# Patient Record
Sex: FEMALE | Race: WHITE | NOT HISPANIC OR LATINO | Employment: FULL TIME | URBAN - METROPOLITAN AREA
[De-identification: names, ages, dates, MRNs, and addresses within clinical notes are randomized per-mention and may not be internally consistent; named-entity substitution may affect disease eponyms.]

---

## 2024-09-04 ENCOUNTER — OFFICE VISIT (OUTPATIENT)
Dept: URGENT CARE | Facility: CLINIC | Age: 34
End: 2024-09-04
Payer: COMMERCIAL

## 2024-09-04 VITALS
BODY MASS INDEX: 40.22 KG/M2 | DIASTOLIC BLOOD PRESSURE: 69 MMHG | TEMPERATURE: 99 F | RESPIRATION RATE: 20 BRPM | HEIGHT: 63 IN | SYSTOLIC BLOOD PRESSURE: 118 MMHG | WEIGHT: 227 LBS | OXYGEN SATURATION: 100 % | HEART RATE: 105 BPM

## 2024-09-04 DIAGNOSIS — R09.89 SYMPTOMS OF UPPER RESPIRATORY INFECTION (URI): ICD-10-CM

## 2024-09-04 DIAGNOSIS — U07.1 COVID-19: Primary | ICD-10-CM

## 2024-09-04 LAB
CTP QC/QA: YES
SARS-COV-2 AG RESP QL IA.RAPID: POSITIVE

## 2024-09-04 PROCEDURE — 99214 OFFICE O/P EST MOD 30 MIN: CPT | Mod: S$PBB,,,

## 2024-09-04 PROCEDURE — 99214 OFFICE O/P EST MOD 30 MIN: CPT | Mod: PBBFAC

## 2024-09-04 PROCEDURE — 87811 SARS-COV-2 COVID19 W/OPTIC: CPT | Mod: PBBFAC

## 2024-09-04 RX ORDER — BENZONATATE 200 MG/1
200 CAPSULE ORAL 3 TIMES DAILY PRN
Qty: 30 CAPSULE | Refills: 0 | Status: SHIPPED | OUTPATIENT
Start: 2024-09-04 | End: 2024-09-14

## 2024-09-04 NOTE — PATIENT INSTRUCTIONS
CDC RECOMMENDATIONS     If You Test Positive for COVID-19 (Isolate)  Everyone, regardless of vaccination status.    Stay home for 5 days.  If you have no symptoms or your symptoms are resolving after 5 days, you can leave your house.  Continue to wear a mask around others for 5 additional days.  If you have a fever, continue to stay home until your fever resolves.

## 2024-09-04 NOTE — LETTER
September 4, 2024      [facility]  [address] [address]  Phone: [phone]       Patient: Fanny Murguia   YOB: 1990  Date of Visit: 09/04/2024    To Whom It May Concern:    Jessica Murguia  was at [facility] on 09/04/2024. The patient may return to work/school on 09/09/2024 with no restrictions. If you have any questions or concerns, or if I can be of further assistance, please do not hesitate to contact me.    Sincerely,    DEEPTI Velasquez

## 2024-09-04 NOTE — PROGRESS NOTES
"Subjective:       Patient ID: Fanny Murguia is a 34 y.o. female.    Vitals:  height is 5' 3" (1.6 m) and weight is 103 kg (227 lb). Her oral temperature is 98.8 °F (37.1 °C). Her blood pressure is 118/69 and her pulse is 105. Her respiration is 20 and oxygen saturation is 100%.     Chief Complaint: Sore Throat (X yesterday. Chills, hoarseness, nausea.)    Agree with CC, 33 y/o white female, has tried Claritin w/Corcidin. Patient's last menstrual period was 02/01/2024 (approximate).      Sore Throat   Associated symptoms include congestion. Pertinent negatives include no coughing, diarrhea, headaches, shortness of breath, trouble swallowing or vomiting.       Constitution: Positive for chills and sweating.   HENT:  Positive for congestion, sore throat and voice change. Negative for trouble swallowing.    Respiratory:  Negative for cough, sputum production, shortness of breath, wheezing and asthma.    Gastrointestinal:  Positive for nausea. Negative for vomiting and diarrhea.   Allergic/Immunologic: Negative for asthma.   Neurological:  Negative for dizziness and headaches.       Objective:      Physical Exam   Constitutional: She is oriented to person, place, and time. She is cooperative. She is easily aroused.  Non-toxic appearance. She does not appear ill. No distress. overweightawake  HENT:   Head: Normocephalic and atraumatic.   Ears:   Right Ear: Tympanic membrane and ear canal normal.   Left Ear: Tympanic membrane and ear canal normal.   Nose: Nose normal.   Mouth/Throat: Uvula is midline, oropharynx is clear and moist and mucous membranes are normal.   Hoarse phonation       Comments: Hoarse phonation   Eyes: Conjunctivae and lids are normal.   Neck: Neck supple.   Cardiovascular: S1 normal, S2 normal and normal heart sounds. Tachycardia present.   Pulmonary/Chest: Effort normal and breath sounds normal.   Musculoskeletal:      Right lower leg: No edema.      Left lower leg: No edema.   Lymphadenopathy: "     She has cervical adenopathy.        Left cervical: Superficial cervical adenopathy present.   Neurological: She is alert, oriented to person, place, and time and easily aroused. GCS eye subscore is 4. GCS verbal subscore is 5. GCS motor subscore is 6.   Skin: Skin is warm, dry, intact and not diaphoretic. Capillary refill takes less than 2 seconds.   Psychiatric: Her speech is normal and behavior is normal.   Nursing note and vitals reviewed.        Assessment:       1. COVID-19    2. Symptoms of upper respiratory infection (URI)          Plan:     COVID test is positive today in clinic, [facility] updated quarantine guidelines discussed, encourage patient to remain hydrated, Tylenol as needed, when to seek ER treatment. She appears stable for discharge.     COVID-19  -     benzonatate (TESSALON) 200 MG capsule; Take 1 capsule (200 mg total) by mouth 3 (three) times daily as needed for Cough.  Dispense: 30 capsule; Refill: 0    Symptoms of upper respiratory infection (URI)  -     SARS Coronavirus 2 Antigen, POCT Manual Read  -     benzonatate (TESSALON) 200 MG capsule; Take 1 capsule (200 mg total) by mouth 3 (three) times daily as needed for Cough.  Dispense: 30 capsule; Refill: 0           Results for orders placed or performed in visit on 09/04/24   SARS Coronavirus 2 Antigen, POCT Manual Read   Result Value Ref Range    SARS Coronavirus 2 Antigen Positive (A) Negative     Acceptable Yes

## 2024-09-06 ENCOUNTER — OFFICE VISIT (OUTPATIENT)
Dept: URGENT CARE | Facility: CLINIC | Age: 34
End: 2024-09-06
Payer: COMMERCIAL

## 2024-09-06 VITALS
HEIGHT: 63 IN | RESPIRATION RATE: 20 BRPM | SYSTOLIC BLOOD PRESSURE: 138 MMHG | TEMPERATURE: 98 F | BODY MASS INDEX: 40.61 KG/M2 | WEIGHT: 229.19 LBS | HEART RATE: 75 BPM | OXYGEN SATURATION: 96 % | DIASTOLIC BLOOD PRESSURE: 83 MMHG

## 2024-09-06 DIAGNOSIS — R09.81 NASAL CONGESTION: ICD-10-CM

## 2024-09-06 DIAGNOSIS — R05.1 ACUTE COUGH: ICD-10-CM

## 2024-09-06 DIAGNOSIS — J02.9 SORE THROAT: Primary | ICD-10-CM

## 2024-09-06 DIAGNOSIS — U07.1 COVID-19: ICD-10-CM

## 2024-09-06 LAB
CTP QC/QA: YES
MOLECULAR STREP A: NEGATIVE

## 2024-09-06 PROCEDURE — 99213 OFFICE O/P EST LOW 20 MIN: CPT | Mod: PBBFAC

## 2024-09-06 RX ORDER — FLUTICASONE PROPIONATE 50 MCG
2 SPRAY, SUSPENSION (ML) NASAL DAILY
Qty: 16 G | Refills: 0 | Status: SHIPPED | OUTPATIENT
Start: 2024-09-06

## 2024-09-06 RX ORDER — PROMETHAZINE HYDROCHLORIDE AND DEXTROMETHORPHAN HYDROBROMIDE 6.25; 15 MG/5ML; MG/5ML
5 SYRUP ORAL EVERY 8 HOURS PRN
Qty: 120 ML | Refills: 0 | Status: SHIPPED | OUTPATIENT
Start: 2024-09-06

## 2024-09-06 RX ORDER — IBUPROFEN 600 MG/1
600 TABLET ORAL EVERY 6 HOURS PRN
Qty: 15 TABLET | Refills: 0 | Status: SHIPPED | OUTPATIENT
Start: 2024-09-06

## 2024-09-06 RX ORDER — LORATADINE 10 MG/1
10 TABLET ORAL DAILY PRN
Qty: 30 TABLET | Refills: 0 | Status: SHIPPED | OUTPATIENT
Start: 2024-09-06 | End: 2024-10-06

## 2024-09-06 NOTE — PROGRESS NOTES
"Subjective:      Patient ID: Fanny Murguia is a 34 y.o. female.    Vitals:  height is 5' 3" (1.6 m) and weight is 104 kg (229 lb 3.2 oz). Her oral temperature is 98 °F (36.7 °C). Her blood pressure is 138/83 and her pulse is 75. Her respiration is 20 and oxygen saturation is 96%.     Chief Complaint: Sore Throat (Return visit already tested + for covid. Cough, sore throat, dizziness.Requesting strep testing. )    CC as above.  She is taking coricidin and doing saline nasal irrigation, no relief.     Sore Throat   This is a new problem. The current episode started in the past 7 days. The problem has been unchanged. There has been no fever. The pain is moderate. Associated symptoms include congestion, coughing, headaches and a plugged ear sensation.       HENT:  Positive for congestion and sore throat.    Respiratory:  Positive for cough.    Neurological:  Positive for headaches.      Objective:     Physical Exam   Constitutional: She is oriented to person, place, and time. She appears well-developed. She is cooperative.  Non-toxic appearance. No distress. obesity  HENT:   Head: Normocephalic and atraumatic.   Ears:   Right Ear: Hearing and external ear normal. A middle ear effusion is present.   Left Ear: Hearing and external ear normal. A middle ear effusion is present.   Nose: Mucosal edema and congestion present. No rhinorrhea or nasal deformity. No epistaxis. Right sinus exhibits no maxillary sinus tenderness and no frontal sinus tenderness. Left sinus exhibits no maxillary sinus tenderness and no frontal sinus tenderness.   Mouth/Throat: Uvula is midline and mucous membranes are normal. Mucous membranes are moist. No trismus in the jaw. Normal dentition. No uvula swelling. Posterior oropharyngeal erythema present. No oropharyngeal exudate or posterior oropharyngeal edema. Oropharynx is clear.   Moderate nasal turbinate, postnasal drip      Comments: Moderate nasal turbinate, postnasal drip  Eyes: " Conjunctivae and lids are normal. No scleral icterus. vision grossly intact periorbital hyperpigmentation   Neck: Trachea normal and phonation normal. Neck supple. No edema present. No erythema present. No neck rigidity present.   Cardiovascular: Normal rate, regular rhythm, normal heart sounds and normal pulses.   Pulmonary/Chest: Effort normal and breath sounds normal. No respiratory distress. She has no decreased breath sounds. She has no wheezes. She has no rhonchi.   Abdominal: Normal appearance.   Musculoskeletal: Normal range of motion.         General: No deformity. Normal range of motion.   Lymphadenopathy:     She has no cervical adenopathy.   Neurological: no focal deficit. She is alert and oriented to person, place, and time. She exhibits normal muscle tone. Coordination normal.   Skin: Skin is warm, dry, intact, not diaphoretic and not pale.   Psychiatric: Her speech is normal and behavior is normal. Mood normal.   Nursing note and vitals reviewed.      Assessment:     1. Sore throat    2. Nasal congestion    3. Acute cough    4. COVID-19      Results for orders placed or performed in visit on 09/06/24   POCT Strep A, Molecular   Result Value Ref Range    Molecular Strep A, POC Negative Negative     Acceptable Yes         Plan:       Sore throat  -     POCT Strep A, Molecular  -     loratadine (CLARITIN) 10 mg tablet; Take 1 tablet (10 mg total) by mouth daily as needed for Allergies.  Dispense: 30 tablet; Refill: 0  -     ibuprofen (ADVIL,MOTRIN) 600 MG tablet; Take 1 tablet (600 mg total) by mouth every 6 (six) hours as needed for Pain. Take with food  Dispense: 15 tablet; Refill: 0    Nasal congestion  -     fluticasone propionate (FLONASE) 50 mcg/actuation nasal spray; 2 sprays (100 mcg total) by Each Nostril route once daily.  Dispense: 16 g; Refill: 0    Acute cough  -     loratadine (CLARITIN) 10 mg tablet; Take 1 tablet (10 mg total) by mouth daily as needed for Allergies.   Dispense: 30 tablet; Refill: 0  -     promethazine-dextromethorphan (PROMETHAZINE-DM) 6.25-15 mg/5 mL Syrp; Take 5 mLs by mouth every 8 (eight) hours as needed (cough).  Dispense: 120 mL; Refill: 0    COVID-19    Rest. Drink plenty of fluids. Tylenol or ibuprofen for any fever or aches.  Flonase and loratadine daily for nasal congestion and sinus symptoms as needed.  Cough medication may cause drowsiness, no driving when taking. Return for any new or worsening symptoms.

## 2024-09-06 NOTE — LETTER
September 6, 2024      [facility]  [facility] [facility] [address]  Phone: [phone]       Patient: Fanny Murguia   YOB: 1990  Date of Visit: 09/06/2024    To Whom It May Concern:    Jessica Murguia  was at [facility] on 09/06/2024. The patient may return to work/school on 9/8/24 with no restrictions. If you have any questions or concerns, or if I can be of further assistance, please do not hesitate to contact me.    Sincerely,    TETE Young NP

## 2024-09-06 NOTE — PATIENT INSTRUCTIONS
Rest. Drink plenty of fluids. Tylenol or ibuprofen for any fever or aches.  Flonase and loratadine daily for nasal congestion and sinus symptoms as needed.  Cough medication may cause drowsiness, no driving when taking. Return for any new or worsening symptoms. Go to the nearest ER for worsening symptoms, such as severe pain, chest pain, shortness of breath, palpitations, etc.

## 2024-10-14 DIAGNOSIS — R33.9 RETENTION OF URINE, UNSPECIFIED: Primary | ICD-10-CM

## 2024-10-23 ENCOUNTER — HOSPITAL ENCOUNTER (OUTPATIENT)
Dept: RADIOLOGY | Facility: HOSPITAL | Age: 34
Discharge: HOME | End: 2024-10-23
Attending: UROLOGY
Payer: COMMERCIAL

## 2024-10-23 DIAGNOSIS — R33.9 RETENTION OF URINE, UNSPECIFIED: ICD-10-CM

## 2024-10-23 PROCEDURE — 74178 CT ABD&PLV WO CNTR FLWD CNTR: CPT | Mod: TC

## 2024-10-23 PROCEDURE — 25500020 PHARM REV CODE 255

## 2024-10-23 RX ADMIN — IOHEXOL 100 ML: 350 INJECTION, SOLUTION INTRAVENOUS at 03:10

## 2024-11-11 ENCOUNTER — OFFICE VISIT (OUTPATIENT)
Dept: GASTROENTEROLOGY | Facility: CLINIC | Age: 34
End: 2024-11-11
Payer: COMMERCIAL

## 2024-11-11 VITALS
OXYGEN SATURATION: 97 % | HEART RATE: 83 BPM | WEIGHT: 227.63 LBS | DIASTOLIC BLOOD PRESSURE: 86 MMHG | TEMPERATURE: 98 F | RESPIRATION RATE: 16 BRPM | HEIGHT: 63 IN | SYSTOLIC BLOOD PRESSURE: 129 MMHG | BODY MASS INDEX: 40.33 KG/M2

## 2024-11-11 DIAGNOSIS — Z87.738 HISTORY OF HIRSCHSPRUNG'S DISEASE: ICD-10-CM

## 2024-11-11 DIAGNOSIS — K59.04 CHRONIC IDIOPATHIC CONSTIPATION: Primary | ICD-10-CM

## 2024-11-11 PROBLEM — K92.1 HEMATOCHEZIA: Status: RESOLVED | Noted: 2023-03-30 | Resolved: 2024-11-11

## 2024-11-11 PROCEDURE — 3008F BODY MASS INDEX DOCD: CPT | Mod: CPTII,,, | Performed by: NURSE PRACTITIONER

## 2024-11-11 PROCEDURE — 1159F MED LIST DOCD IN RCRD: CPT | Mod: CPTII,,, | Performed by: NURSE PRACTITIONER

## 2024-11-11 PROCEDURE — 3074F SYST BP LT 130 MM HG: CPT | Mod: CPTII,,, | Performed by: NURSE PRACTITIONER

## 2024-11-11 PROCEDURE — 99214 OFFICE O/P EST MOD 30 MIN: CPT | Mod: S$PBB,,, | Performed by: NURSE PRACTITIONER

## 2024-11-11 PROCEDURE — 99213 OFFICE O/P EST LOW 20 MIN: CPT | Mod: PBBFAC | Performed by: NURSE PRACTITIONER

## 2024-11-11 PROCEDURE — 1160F RVW MEDS BY RX/DR IN RCRD: CPT | Mod: CPTII,,, | Performed by: NURSE PRACTITIONER

## 2024-11-11 PROCEDURE — 3079F DIAST BP 80-89 MM HG: CPT | Mod: CPTII,,, | Performed by: NURSE PRACTITIONER

## 2024-11-11 RX ORDER — LUBIPROSTONE 8 UG/1
8 CAPSULE ORAL 2 TIMES DAILY
Qty: 60 CAPSULE | Refills: 5 | Status: SHIPPED | OUTPATIENT
Start: 2024-11-11

## 2024-11-11 RX ORDER — OXYBUTYNIN CHLORIDE 10 MG/1
10 TABLET, EXTENDED RELEASE ORAL
COMMUNITY
Start: 2024-10-31

## 2024-11-11 NOTE — ASSESSMENT & PLAN NOTE
Recommend soluble fiber supplementation  Avoid straining or sitting on the toilet for long periods of time  Pelvic ultrasound August 9, 2023 revealed increased ovarian volumes and otherwise unremarkable exam.    CT scan abdomen and pelvis without contrast August 26, 2023 revealed enlarged appearance of the left ovary, potentially by underlying large follicle, located high in the left hemipelvis and posterior to the descending colon.  In the setting of left lower quadrant pain, further ovary evaluation with ultrasound suggested.  Given the position of the ovary, transabdominal technique suggested.  Mild hepatic steatosis.  Small nonobstructive right renal stones.    Pelvic ultrasound August 26, 2023 revealed left ovarian cyst.  She underwent colonoscopy December 7, 2023 which was normal and no specimens were collected with a recommended recall of 10 years.  Continue Amitiza 8 mcg twice daily as directed (prescription sent to [facility] and requested that she compare prices between there and [facility])  Call with updates  Follow-up clinic visit with NP in 6 months

## 2024-11-11 NOTE — LETTER
November 12, 2024      [facility]  [address] [address]  Phone: [phone]       Patient: Fanny Murguia   YOB: 1990  Date of Visit: 11/12/2024    To Whom It May Concern:    Jessica Murguia  was at [facility] on 11/12/2024. The patient may return to work/school on 11/12/2024 with no restrictions. If you have any questions or concerns, or if I can be of further assistance, please do not hesitate to contact me.    Sincerely,    Miladis Holloway NP

## 2024-11-11 NOTE — PROGRESS NOTES
Subjective:       Patient ID: Fanny Murguia is a 34 y.o. female.    Chief Complaint: Chronic constipation (Pt states that she feels that she isnt using the bathroom enough. 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  She was referred for constipation and seen March 30, 2023.  She reported a longstanding history of chronic constipation and going up to 5-7 days without a bowel movement.  She had tried several OTC medications for treatment of constipation with minimal relief noted including stool softeners and magnesium citrate.  She had associated bloating and gas.  She had occasional straining and did not always feel completely evacuated when she did have a bowel movement.  She had occasional red blood with bowel movements noted on the tissue after wiping that was very infrequent.  She expressed concerns about hemorrhoids.  She had occasional nausea without vomiting.  She had tried increasing dietary fiber and was seeing a dietitian for nutritional counseling.  Her appetite was good and her weight was stable.  She denied fever, chills, hematemesis, odynophagia, dysphagia, acid reflux, pyrosis, or early satiety.  She denied melena, fecal urgency, fecal incontinence, or pain with defecation.     Pelvic ultrasound August 9, 2023 revealed increased ovarian volumes and otherwise unremarkable exam.  CT scan abdomen and pelvis without contrast August 26, 2023 revealed enlarged appearance of the left ovary, potentially by underlying large follicle, located high in the left hemipelvis and posterior to the descending colon.  In the setting of left lower quadrant pain, further ovary evaluation with ultrasound suggested.  Given the position of the ovary, transabdominal technique suggested.  Mild hepatic steatosis.  Small nonobstructive right  renal stones.  Pelvic ultrasound August 26, 2023 revealed left ovarian cyst.     She was seen for a follow-up visit November 9, 2023.  She reported taking Linzess 145 mcg as needed and then 72 mcg as needed but having to stop the medication and dosages secondary to feeling drained with taking the medication.  She reported minimal change in symptoms from previous office visit in regards to constipation, bloating, gas, and abdominal discomfort.  Her appetite was good and her weight was stable.  She denied fever, chills, hematemesis, odynophagia, dysphagia, acid reflux, pyrosis, or early satiety.  She denied melena, hematochezia, fecal urgency, fecal incontinence, or pain with defecation.  She was prescribed Amitiza 8 mcg twice daily.     She underwent colonoscopy December 7, 2023 which was normal and no specimens were collected with a recommended recall of 10 years.     She was seen for a follow-up visit May 9, 2024.  She reported taking Amitiza 8 mcg twice daily as needed, as well as increased water intake and MiraLax as needed.  She had made dietary modifications.  This had alleviated symptoms of constipation, bloating, gas, and abdominal discomfort.  She reported feeling well at that time without any problems noted.    CT scan abdomen and pelvis with and without contrast October 23, 2024 revealed couple of punctate nonobstructing medullary calculi in the right kidney, otherwise unremarkable.     Today, she presents for a follow-up visit.  She reports that she has been out of Amitiza mcg for the past several months as she was unsure of the medication was sent to [facility] pharmacy.  I did inform the patient at time of last office visit May 9, 2024 that the medication was being sent to the pharmacy and this was verified.  She reports less frequent bowel movements since being out of Amitiza and is requesting a refill of the medication.  She typically has 1 formed stool every 1-3 days but does not always feel completely  evacuated.  She denies melena, hematochezia, fecal urgency, fecal incontinence, or pain with defecation.  She denies abdominal pain.  Her appetite is good and her weight is stable.  She denies fever, chills, nausea, vomiting, hematemesis, odynophagia, dysphagia, acid reflux, pyrosis, early satiety, or abdominal pain.     She denies ever having an EGD done. She denies regular NSAID use or use of blood thinners. She denies tobacco or alcohol use. She denies a family history of IBD, colon polyps, or colon cancer.      Review of patient's allergies indicates:   Allergen Reactions    Codeine      Pt reports reaction is convulsions    Hydrocodone     Morphine     Pcn [penicillins]      Pt reports reaction is convulsions    Sulfa (sulfonamide antibiotics) Rash    Tramadol Other (See Comments)    Ketorolac Other (See Comments)    Levofloxacin Hives     Other reaction(s): Generalized body aches    Metformin Diarrhea    Metronidazole     Norethindrone ac-eth estradiol Other (See Comments)     Other reaction(s): Mood swings  Other reaction(s): Mood swings    Doxycycline Nausea And Vomiting    Nitrofurantoin monohyd/m-cryst Nausea And Vomiting     Past Medical History:   Diagnosis Date    ADHD (attention deficit hyperactivity disorder)     Anxiety     Hirschsprung's disease     Kidney stone     OCD (obsessive compulsive disorder)     PCOS (polycystic ovarian syndrome)      Past Surgical History:   Procedure Laterality Date    COLON SURGERY  1990    When born    COLONOSCOPY N/A 12/07/2023    Procedure: COLONOSCOPY;  Surgeon: Miguelito Bird MD;  Location: [facility] ENDOSCOPY;  Service: Endoscopy;  Laterality: N/A;    COLOSTOMY  1990    2 months old    COLOSTOMY CLOSURE  1991    10 months old    LAPAROSCOPIC ENDO-RECTAL PULL THROUGH FOR HIRSCHSPRUNG'S DISEASE      SMALL INTESTINE SURGERY  1990    Imtestinal surgery when I was born.     Family History:   family history includes Breast cancer (age of onset: 30) in her maternal aunt;  Cancer in her father; Endometriosis in her sister; Heart disease in her maternal aunt, maternal grandmother, paternal grandfather, and paternal grandmother; Hirschsprung's disease in her father; Hypertension in her mother; No Known Problems in her brother, brother, brother, and sister; Ovarian cancer in her maternal aunt.    Social History:    reports that she quit smoking about 5 years ago. Her smoking use included cigarettes and vaping with nicotine. She has quit using smokeless tobacco. She reports current alcohol use. She reports that she does not use drugs.    Review of Systems  Negative except as noted in the HPI.      Objective:      Physical Exam  Constitutional:       Appearance: Normal appearance.   HENT:      Head: Normocephalic.      Mouth/Throat:      Mouth: Mucous membranes are moist.   Eyes:      Extraocular Movements: Extraocular movements intact.      Conjunctiva/sclera: Conjunctivae normal.      Pupils: Pupils are equal, round, and reactive to light.   Cardiovascular:      Rate and Rhythm: Normal rate and regular rhythm.      Pulses: Normal pulses.      Heart sounds: Normal heart sounds.   Pulmonary:      Effort: Pulmonary effort is normal.      Breath sounds: Normal breath sounds.   Abdominal:      General: Bowel sounds are normal.      Palpations: Abdomen is soft.   Musculoskeletal:         General: Normal range of motion.      Cervical back: Normal range of motion and neck supple.   Skin:     General: Skin is warm and dry.   Neurological:      General: No focal deficit present.      Mental Status: She is alert and oriented to person, place, and time.   Psychiatric:         Mood and Affect: Mood normal.         Behavior: Behavior normal.         Thought Content: Thought content normal.         Judgment: Judgment normal.         Home Medications:     Current Outpatient Medications   Medication Sig    diclofenac sodium (VOLTAREN ARTHRITIS PAIN) 1 % Gel Apply 2 g topically 4 (four) times daily. Do  not exceed 32 grams/day: do not to exceed 8 grams/day/single joint of upper extremities; do not to exceed 16 grams/day/single joint of lower extremities.  Please request refill of this medication from your PCP.    oxybutynin (DITROPAN-XL) 10 MG 24 hr tablet Take 10 mg by mouth.    lubiprostone (AMITIZA) 8 MCG Cap Take 1 capsule (8 mcg total) by mouth 2 (two) times daily.     No current facility-administered medications for this visit.     Laboratory Results:     Recent Results (from the past 24 weeks)   POCT urine pregnancy    Collection Time: 07/08/24  3:13 PM   Result Value Ref Range    POC Preg Test, Ur Negative Negative     Acceptable Yes    POCT Urinalysis, Dipstick, Automated, W/O Scope    Collection Time: 07/08/24  3:13 PM   Result Value Ref Range    POC Blood, Urine Negative Negative    POC Bilirubin, Urine Negative Negative    POC Urobilinogen, Urine 1.0 0.1 - 1.1    POC Ketones, Urine Negative Negative    POC Protein, Urine Negative Negative    POC Nitrates, Urine Negative Negative    POC Glucose, Urine Negative Negative    pH, UA 7.0     POC Specific Gravity, Urine 1.025 1.003 - 1.029    POC Leukocytes, Urine Negative Negative   Urinalysis, Reflex to Urine Culture    Collection Time: 07/08/24  3:30 PM    Specimen: Urine   Result Value Ref Range    Color, UA Light-Yellow Yellow, Light-Yellow, Dark Yellow, Tara, Straw    Appearance, UA Clear Clear    Specific Gravity, UA 1.035 (H) 1.005 - 1.030    pH, UA 6.5 5.0 - 8.5    Protein, UA Trace (A) Negative    Glucose, UA Normal Negative, Normal    Ketones, UA Negative Negative    Blood, UA Negative Negative    Bilirubin, UA Negative Negative    Urobilinogen, UA Normal 0.2, 1.0, Normal    Nitrites, UA Negative Negative    Leukocyte Esterase, UA Negative Negative    RBC, UA 0-5 None Seen, 0-2, 3-5, 0-5 /HPF    WBC, UA 0-5 None Seen, 0-2, 3-5, 0-5 /HPF    Bacteria, UA Trace (A) None Seen /HPF    Squamous Epithelial Cells, UA Few (A) None Seen /HPF     Mucous, UA Few (A) None Seen /LPF    Hyaline Casts, UA None Seen None Seen /lpf   Comprehensive Metabolic Panel    Collection Time: 07/17/24  9:57 AM   Result Value Ref Range    Sodium 141 136 - 145 mmol/L    Potassium 4.6 3.5 - 5.1 mmol/L    Chloride 102 98 - 107 mmol/L    CO2 26 21 - 32 mmol/L    Glucose 100 74 - 106 mg/dL    Blood Urea Nitrogen 12 7.0 - 18.0 mg/dL    Creatinine 0.68 0.60 - 1.30 mg/dL    Calcium 10.0 8.5 - 10.1 mg/dL    Protein Total 7.9 6.4 - 8.2 gm/dL    Albumin 5.1 (H) 3.4 - 5.0 g/dL    Globulin 2.8 1.2 - 3.0 gm/dL    Albumin/Globulin Ratio 1.8 1.5 - 2.0 ratio    Bilirubin Total 0.5 0.2 - 1.0 mg/dL    ALP 83 38 - 126 unit/L    ALT 28 7 - 52 unit/L    AST 22 15 - 37 unit/L    eGFR >60 mL/min/1.73/m2    BUN/Creatinine Ratio 18    Bilirubin, Direct    Collection Time: 07/17/24  9:57 AM   Result Value Ref Range    Bilirubin Direct 0.1 0.0 - 0.5 mg/dL   TSH    Collection Time: 07/17/24  9:57 AM   Result Value Ref Range    TSH 1.174 0.350 - 4.940 uIU/mL   Urinalysis    Collection Time: 07/17/24  9:57 AM   Result Value Ref Range    Color, UA Yellow Yellow, Light-Yellow, Dark Yellow, Tara, Straw    Appearance, UA Clear Clear    Specific Gravity, UA >=1.030 1.005 - 1.030    pH, UA 6.0 5.0 - 8.5    Protein, UA Negative Negative    Glucose, UA Negative Negative, Normal    Ketones, UA Negative Negative    Blood, UA Negative Negative    Bilirubin, UA Negative Negative    Urobilinogen, UA 0.2 0.2, 1.0, Normal    Nitrites, UA Negative Negative    Leukocyte Esterase, UA Negative Negative   Lipid Panel    Collection Time: 07/17/24  9:57 AM   Result Value Ref Range    Cholesterol Total 188 0 - 200 mg/dL    HDL Cholesterol 51 35 - 60 mg/dL    Triglyceride 73 30 - 150 mg/dL    Cholesterol/HDL Ratio 4     Very Low Density Lipoprotein 15     LDL Cholesterol 122.00 0.00 - 129.00 mg/dL   CBC with Differential    Collection Time: 07/17/24  9:57 AM   Result Value Ref Range    WBC 7.00 4.50 - 11.50 x10(3)/mcL    RBC  5.24 4.20 - 5.40 x10(6)/mcL    Hgb 15.2 12.0 - 16.0 g/dL    Hct 44.8 37.0 - 47.0 %    MCV 85.5 80.0 - 94.0 fL    MCH 29.0 27.0 - 31.0 pg    MCHC 33.9 33.0 - 36.0 g/dL    RDW 12.5 11.5 - 17.0 %    Platelet 314 130 - 400 x10(3)/mcL    MPV 9.7 7.4 - 10.4 fL    Neut % 64.0 %    Lymph % 26.5 %    Mono % 9.5 %    Lymph # 1.9 0.6 - 4.6 x10(3)/mcL    Neut # 4.4 2.1 - 9.2 x10(3)/mcL    Mono # 0.7 0.1 - 1.3 x10(3)/mcL   Urinalysis, Microscopic    Collection Time: 07/17/24  9:57 AM   Result Value Ref Range    Bacteria, UA None Seen None Seen, Rare, Occasional /HPF    RBC, UA None Seen None Seen, 0-2, 3-5, 0-5 /HPF    WBC, UA 0-2 None Seen, 0-2, 3-5, 0-5 /HPF    Squamous Epithelial Cells, UA Rare None Seen, Rare, Occasional, Occ /HPF   POCT Urinalysis, Dipstick, Automated, W/O Scope    Collection Time: 07/30/24  6:12 PM   Result Value Ref Range    POC Blood, Urine Negative Negative    POC Bilirubin, Urine Negative Negative    POC Urobilinogen, Urine 0.2e.u./dl 0.1 - 1.1    POC Ketones, Urine Negative Negative    POC Protein, Urine Negative Negative    POC Nitrates, Urine Negative Negative    POC Glucose, Urine Negative Negative    pH, UA 7.0     POC Specific Gravity, Urine 1.025 1.003 - 1.029    POC Leukocytes, Urine Negative Negative   POCT urine pregnancy    Collection Time: 07/30/24  6:13 PM   Result Value Ref Range    POC Preg Test, Ur Negative Negative     Acceptable Yes    Trichomonas vaginalis Amplified RNA    Collection Time: 07/30/24  6:19 PM    Specimen: Urine   Result Value Ref Range    SOURCE: URINE     Trichomonas vaginalis amplified RNA Negative Negative   Chlamydia/GC, PCR    Collection Time: 07/30/24  6:19 PM    Specimen: Urine   Result Value Ref Range    Chlamydia trachomatis PCR Not Detected Not Detected    N. gonorrhea PCR Not Detected Not Detected    Source Urine    Wet Prep, Genital    Collection Time: 07/30/24  6:44 PM    Specimen: Cervicovaginal; Genital   Result Value Ref Range    WBC, Wet  Prep Few (A) None Seen    Clue Cells, Wet Prep None Seen None Seen    Trichomonas, Wet Prep None Seen None Seen    Yeast, Wet Prep None Seen None Seen   Urinalysis, Reflex to Urine Culture    Collection Time: 08/03/24 12:05 AM    Specimen: Urine   Result Value Ref Range    Color, UA Light-Yellow Yellow, Light-Yellow, Dark Yellow, Tara, Straw    Appearance, UA Clear Clear    Specific Gravity, UA >=1.030 1.005 - 1.030    pH, UA 6.0 5.0 - 8.5    Protein, UA Negative Negative    Glucose, UA Negative Negative, Normal    Ketones, UA Negative Negative    Blood, UA Negative Negative    Bilirubin, UA Negative Negative    Urobilinogen, UA 0.2 0.2, 1.0, Normal    Nitrites, UA Negative Negative    Leukocyte Esterase, UA Negative Negative   Pregnancy, urine rapid    Collection Time: 08/03/24 12:05 AM   Result Value Ref Range    hCG Qualitative, Urine Negative Negative   Comp. Metabolic Panel    Collection Time: 08/03/24  2:00 AM   Result Value Ref Range    Sodium 141 136 - 145 mmol/L    Potassium 3.9 3.5 - 5.1 mmol/L    Chloride 105 98 - 107 mmol/L    CO2 26 22 - 29 mmol/L    Glucose 102 (H) 74 - 100 mg/dL    Blood Urea Nitrogen 12.7 7.0 - 18.7 mg/dL    Creatinine 0.81 0.55 - 1.02 mg/dL    Calcium 10.1 8.4 - 10.2 mg/dL    Protein Total 8.0 6.4 - 8.3 gm/dL    Albumin 4.4 3.5 - 5.0 g/dL    Globulin 3.6 (H) 2.4 - 3.5 gm/dL    Albumin/Globulin Ratio 1.2 1.1 - 2.0 ratio    Bilirubin Total 0.3 <=1.5 mg/dL    ALP 95 40 - 150 unit/L    ALT 30 0 - 55 unit/L    AST 17 5 - 34 unit/L    eGFR >60 mL/min/1.73/m2    Anion Gap 10.0 mEq/L    BUN/Creatinine Ratio 16    Lipase    Collection Time: 08/03/24  2:00 AM   Result Value Ref Range    Lipase Level 21 <=60 U/L   CBC with Differential    Collection Time: 08/03/24  2:00 AM   Result Value Ref Range    WBC 9.27 4.50 - 11.50 x10(3)/mcL    RBC 5.06 4.20 - 5.40 x10(6)/mcL    Hgb 14.8 12.0 - 16.0 g/dL    Hct 43.3 37.0 - 47.0 %    MCV 85.6 80.0 - 94.0 fL    MCH 29.2 27.0 - 31.0 pg    MCHC 34.2 33.0  - 36.0 g/dL    RDW 12.6 11.5 - 17.0 %    Platelet 353 130 - 400 x10(3)/mcL    MPV 10.7 (H) 7.4 - 10.4 fL    Neut % 57.9 %    Lymph % 29.2 %    Mono % 8.7 %    Eos % 3.0 %    Basophil % 1.0 %    Lymph # 2.71 0.6 - 4.6 x10(3)/mcL    Neut # 5.36 2.1 - 9.2 x10(3)/mcL    Mono # 0.81 0.1 - 1.3 x10(3)/mcL    Eos # 0.28 0 - 0.9 x10(3)/mcL    Baso # 0.09 <=0.2 x10(3)/mcL    IG# 0.02 0 - 0.04 x10(3)/mcL    IG% 0.2 %    NRBC% 0.0 %   PREGNANCY TEST, URINE RAPID    Collection Time: 08/15/24  2:07 PM   Result Value Ref Range    Preg Test, Ur Negative Negative   SARS Coronavirus 2 Antigen, POCT Manual Read    Collection Time: 09/04/24 10:03 AM   Result Value Ref Range    SARS Coronavirus 2 Antigen Positive (A) Negative     Acceptable Yes    POCT Strep A, Molecular    Collection Time: 09/06/24 10:27 AM   Result Value Ref Range    Molecular Strep A, POC Negative Negative     Acceptable Yes      Imaging Results:     Narrative & Impression  EXAMINATION:  CT ABDOMEN PELVIS W WO CONTRAST     CLINICAL HISTORY:  r33.9; Retention of urine, unspecified     TECHNIQUE:  Low dose axial images, sagittal and coronal reformations were obtained from the lung bases to the pubic symphysis following the IV administration of  contrast.  Pre contrasted imaging was performed as well. Automatic exposure control is utilized to reduce patient radiation exposure.     COMPARISON:  12/09/2022     FINDINGS:  The lung bases are clear.     The liver appears normal.  No liver mass or lesion is seen.  Portal and hepatic veins appear normal.     The gallbladder appears grossly unremarkable.     The pancreas appears normal.  No pancreatic mass or lesion is seen.     The spleen appears normal.  No splenic mass or lesion is seen.     The adrenal glands appear normal.  No adrenal nodule is seen.     The kidneys are normal in size.  No hydronephrosis is seen.  No hydroureter is seen.  There are couple of punctate nonobstructing medullary  calculi in the right kidney in the mid to lower pole.  No ureterolithiasis is seen.     Urinary bladder appears normal.  No bladder distension is seen.     No colitis is seen.  No diverticulitis is seen.  No colonic mass or lesion or inflammatory process is seen.  The appendix appears normal.     No free air is seen.  No free fluid is seen.     Uterus and ovaries appear grossly unremarkable.     The bones appear grossly unremarkable.     Impression:     Couple of punctate nonobstructing medullary calculi in the right kidney     Otherwise unremarkable      Electronically signed by:German Yan  Date:                                            10/23/2024  Time:                                           15:50    Assessment/Plan:     Problem List Items Addressed This Visit          GI    Chronic idiopathic constipation - Primary     Recommend soluble fiber supplementation  Avoid straining or sitting on the toilet for long periods of time  Pelvic ultrasound August 9, 2023 revealed increased ovarian volumes and otherwise unremarkable exam.    CT scan abdomen and pelvis without contrast August 26, 2023 revealed enlarged appearance of the left ovary, potentially by underlying large follicle, located high in the left hemipelvis and posterior to the descending colon.  In the setting of left lower quadrant pain, further ovary evaluation with ultrasound suggested.  Given the position of the ovary, transabdominal technique suggested.  Mild hepatic steatosis.  Small nonobstructive right renal stones.    Pelvic ultrasound August 26, 2023 revealed left ovarian cyst.  She underwent colonoscopy December 7, 2023 which was normal and no specimens were collected with a recommended recall of 10 years.  Continue Amitiza 8 mcg twice daily as directed (prescription sent to [facility] and requested that she compare prices between there and [facility])  Call with updates  Follow-up clinic visit with NP in 6 months         Relevant Medications     lubiprostone (AMITIZA) 8 MCG Cap    History of Hirschsprung's disease     See above

## 2024-11-15 ENCOUNTER — TELEPHONE (OUTPATIENT)
Dept: GYNECOLOGY | Facility: CLINIC | Age: 34
End: 2024-11-15
Payer: COMMERCIAL

## 2024-11-15 NOTE — TELEPHONE ENCOUNTER
----- Message from BRUNILDA Velázquez sent at 11/15/2024  9:11 AM CST -----  Tell her they will decide that (the providers)  when she comes in for her nov visit.  ----- Message -----  From: Dejon Peace  Sent: 11/15/2024   9:10 AM CST  To: [facility] Clinical Support Staff    Pt has an appt with you on 11/22/24 for follow up to ED visit.  She also has an appt on 12/5/24 with Aida Rome for an annual exam.  Pt is requesting to merge these 2 appts.  Please advise whether or not to approve her request.

## 2024-11-20 ENCOUNTER — OFFICE VISIT (OUTPATIENT)
Dept: URGENT CARE | Facility: CLINIC | Age: 34
End: 2024-11-20
Payer: COMMERCIAL

## 2024-11-20 VITALS
BODY MASS INDEX: 40.1 KG/M2 | HEIGHT: 63 IN | TEMPERATURE: 98 F | RESPIRATION RATE: 16 BRPM | SYSTOLIC BLOOD PRESSURE: 121 MMHG | WEIGHT: 226.31 LBS | HEART RATE: 69 BPM | OXYGEN SATURATION: 98 % | DIASTOLIC BLOOD PRESSURE: 80 MMHG

## 2024-11-20 DIAGNOSIS — N89.8 VAGINAL DISCHARGE: Primary | ICD-10-CM

## 2024-11-20 DIAGNOSIS — Z32.02 NEGATIVE PREGNANCY TEST: ICD-10-CM

## 2024-11-20 DIAGNOSIS — J01.90 ACUTE SINUSITIS, RECURRENCE NOT SPECIFIED, UNSPECIFIED LOCATION: ICD-10-CM

## 2024-11-20 LAB
B-HCG UR QL: NEGATIVE
BILIRUB UR QL STRIP: NEGATIVE
CLUE CELLS VAG QL WET PREP: ABNORMAL
CTP QC/QA: YES
GLUCOSE UR QL STRIP: NEGATIVE
KETONES UR QL STRIP: NEGATIVE
LEUKOCYTE ESTERASE UR QL STRIP: NEGATIVE
PH, POC UA: 7
POC BLOOD, URINE: NEGATIVE
POC NITRATES, URINE: NEGATIVE
PROT UR QL STRIP: NEGATIVE
SP GR UR STRIP: 1.02 (ref 1–1.03)
T VAGINALIS VAG QL WET PREP: ABNORMAL
UROBILINOGEN UR STRIP-ACNC: NORMAL (ref 0.1–1.1)
WBC #/AREA VAG WET PREP: ABNORMAL
YEAST SPEC QL WET PREP: ABNORMAL

## 2024-11-20 PROCEDURE — 87210 SMEAR WET MOUNT SALINE/INK: CPT

## 2024-11-20 PROCEDURE — 99214 OFFICE O/P EST MOD 30 MIN: CPT | Mod: S$PBB,,,

## 2024-11-20 PROCEDURE — 99215 OFFICE O/P EST HI 40 MIN: CPT | Mod: PBBFAC

## 2024-11-20 PROCEDURE — 81003 URINALYSIS AUTO W/O SCOPE: CPT | Mod: PBBFAC

## 2024-11-20 PROCEDURE — 81025 URINE PREGNANCY TEST: CPT | Mod: PBBFAC

## 2024-11-20 RX ORDER — LORATADINE 10 MG/1
10 TABLET ORAL DAILY PRN
Qty: 30 TABLET | Refills: 0 | Status: SHIPPED | OUTPATIENT
Start: 2024-11-20 | End: 2024-12-20

## 2024-11-20 RX ORDER — PHENAZOPYRIDINE HYDROCHLORIDE 200 MG/1
200 TABLET, FILM COATED ORAL 3 TIMES DAILY
COMMUNITY
Start: 2024-11-14

## 2024-11-20 RX ORDER — FLUTICASONE PROPIONATE 50 MCG
2 SPRAY, SUSPENSION (ML) NASAL DAILY
Qty: 16 G | Refills: 0 | Status: SHIPPED | OUTPATIENT
Start: 2024-11-20

## 2024-11-20 NOTE — PROGRESS NOTES
"Subjective:      Patient ID: Fanny Murguia is a 34 y.o. female.    Vitals:  height is 5' 3" (1.6 m) and weight is 102.6 kg (226 lb 4.8 oz). Her temperature is 98.1 °F (36.7 °C). Her blood pressure is 121/80 and her pulse is 69. Her respiration is 16 and oxygen saturation is 98%.     Chief Complaint: Otalgia (Otalgia, Lt ear since 0200.) and Vaginal Itching (Vaginal itching/burning since Monday. States feels like there is urine retention. Had scope done Thursday. GYN appt 11/22)    Cc as above. State she has a white discharge with vaginal irritation.     Otalgia   There is pain in the left ear. This is a new problem. The current episode started today. The problem occurs every few hours. The problem has been unchanged. There has been no fever. The pain is mild. Pertinent negatives include no coughing or ear discharge. Associated symptoms comments: sneezing. She has tried nothing for the symptoms.   Vaginal Itching  The patient's primary symptoms include vaginal discharge.       Constitution: Negative.   HENT:  Positive for ear pain. Negative for ear discharge.    Cardiovascular: Negative.    Respiratory: Negative.  Negative for cough.    Genitourinary:  Positive for vaginal discharge.   Skin: Negative.    Allergic/Immunologic: Positive for sneezing.   Neurological: Negative.       Objective:     Physical Exam   Constitutional: She is oriented to person, place, and time. She appears well-developed. She is cooperative.  Non-toxic appearance. No distress. obesityawake  HENT:   Head: Normocephalic and atraumatic.   Ears:   Right Ear: Hearing, external ear and ear canal normal. A middle ear effusion is present.   Left Ear: Hearing, external ear and ear canal normal. A middle ear effusion is present.   Nose: Mucosal edema and congestion present. No rhinorrhea or nasal deformity. No epistaxis. Right sinus exhibits no maxillary sinus tenderness and no frontal sinus tenderness. Left sinus exhibits no maxillary sinus " tenderness and no frontal sinus tenderness.   Mouth/Throat: Uvula is midline, oropharynx is clear and moist and mucous membranes are normal. Mucous membranes are moist. No trismus in the jaw. Normal dentition. No uvula swelling. No oropharyngeal exudate, posterior oropharyngeal edema or posterior oropharyngeal erythema. Oropharynx is clear.   Eyes: Conjunctivae and lids are normal. No scleral icterus. vision grossly intact periorbital hyperpigmentation   Neck: Trachea normal and phonation normal. Neck supple. No edema present. No erythema present. No neck rigidity present.   Cardiovascular: Normal rate, regular rhythm, normal heart sounds and normal pulses.   Pulmonary/Chest: Effort normal and breath sounds normal. No respiratory distress. She has no decreased breath sounds. She has no rhonchi.   Abdominal: Normal appearance.   Genitourinary:    Vaginal discharge present.           Comments: Reports white discharge (exam deferred)     Musculoskeletal: Normal range of motion.         General: No deformity. Normal range of motion.   Neurological: no focal deficit. She is alert and oriented to person, place, and time. She exhibits normal muscle tone. Coordination normal.   Skin: Skin is warm, dry, intact, not diaphoretic and not pale.   Psychiatric: Her speech is normal and behavior is normal. Mood normal.   Nursing note and vitals reviewed.      Assessment:     1. Vaginal discharge    2. Acute sinusitis, recurrence not specified, unspecified location    3. Negative pregnancy test      Results for orders placed or performed in visit on 11/20/24   POCT Urinalysis, Dipstick, Automated, W/O Scope    Collection Time: 11/20/24 12:22 PM   Result Value Ref Range    POC Blood, Urine Negative Negative    POC Bilirubin, Urine Negative Negative    POC Urobilinogen, Urine 0.2e.u./dl 0.1 - 1.1    POC Ketones, Urine Negative Negative    POC Protein, Urine Negative Negative    POC Nitrates, Urine Negative Negative    POC Glucose,  Urine Negative Negative    pH, UA 7.0     POC Specific Gravity, Urine 1.020 1.003 - 1.029    POC Leukocytes, Urine Negative Negative   POCT urine pregnancy    Collection Time: 11/20/24 12:23 PM   Result Value Ref Range    POC Preg Test, Ur Negative Negative     Acceptable Yes          Plan:       Vaginal discharge  -     POCT urine pregnancy  -     POCT Urinalysis, Dipstick, Automated, W/O Scope  -     Wet Prep, Genital    Acute sinusitis, recurrence not specified, unspecified location  -     fluticasone propionate (FLONASE) 50 mcg/actuation nasal spray; 2 sprays (100 mcg total) by Each Nostril route once daily.  Dispense: 16 g; Refill: 0  -     loratadine (CLARITIN) 10 mg tablet; Take 1 tablet (10 mg total) by mouth daily as needed for Allergies.  Dispense: 30 tablet; Refill: 0    Negative pregnancy test    Flonase and loratadine for sinus symptoms. Wet prep test pending. The clinic will call with positive results. Keep schedule appointment with GYN on Friday. Er precautions provided.

## 2024-11-20 NOTE — LETTER
November 20, 2024      [facility]  [facility] [facility] [address]  Phone: [phone]       Patient: Fanny Murguia   YOB: 1990  Date of Visit: 11/20/2024    To Whom It May Concern:    Jessica Murguia  was at [facility] on 11/20/2024. The patient may return to work/school on 11/22/24 with no restrictions. If you have any questions or concerns, or if I can be of further assistance, please do not hesitate to contact me.    Sincerely,  TETE Young NP

## 2024-11-20 NOTE — PATIENT INSTRUCTIONS
Flonase and loratadine for sinus symptoms. Wet prep test pending. The clinic will call with positive results. Keep schedule appointment with GYN on Friday. Go to the nearest ER for worsening symptoms, such as severe abdominal pain, chest pain, shortness of breath, palpitations, etc.

## 2024-11-22 ENCOUNTER — OFFICE VISIT (OUTPATIENT)
Dept: GYNECOLOGY | Facility: CLINIC | Age: 34
End: 2024-11-22
Payer: COMMERCIAL

## 2024-11-22 VITALS
RESPIRATION RATE: 20 BRPM | WEIGHT: 223 LBS | HEART RATE: 68 BPM | BODY MASS INDEX: 38.07 KG/M2 | TEMPERATURE: 98 F | DIASTOLIC BLOOD PRESSURE: 61 MMHG | HEIGHT: 64 IN | SYSTOLIC BLOOD PRESSURE: 103 MMHG | OXYGEN SATURATION: 100 %

## 2024-11-22 DIAGNOSIS — R10.2 PELVIC PAIN: ICD-10-CM

## 2024-11-22 DIAGNOSIS — N83.9 DISORDER OF OVULATION: ICD-10-CM

## 2024-11-22 DIAGNOSIS — N97.9 FEMALE INFERTILITY, PRIMARY: ICD-10-CM

## 2024-11-22 DIAGNOSIS — E28.2 PCOS (POLYCYSTIC OVARIAN SYNDROME): ICD-10-CM

## 2024-11-22 DIAGNOSIS — M79.18 MYALGIA OF PELVIC FLOOR: ICD-10-CM

## 2024-11-22 DIAGNOSIS — E66.812 CLASS 2 OBESITY: Primary | ICD-10-CM

## 2024-11-22 PROCEDURE — 99214 OFFICE O/P EST MOD 30 MIN: CPT | Mod: PBBFAC

## 2024-11-22 RX ORDER — MEDROXYPROGESTERONE ACETATE 10 MG/1
TABLET ORAL
Qty: 90 TABLET | Refills: 3 | Status: SHIPPED | OUTPATIENT
Start: 2024-11-22

## 2024-11-22 NOTE — PROGRESS NOTES
"[facility] NOTE  [facility]  Women[facility] Health Clinic  [address]  [place] [address]  Phone: [phone]  Fax: [phone]    Subjective:     HPI:  Fanny Murguia is a 34 y.o. G0 who presents complaining for AUB/infertility in setting of PCOS.    Today she reports:  - No menses since 2024.  - Decreased premenstrual sx over the past year.  - Wants pregnancy ASAP, prior to this past year without menses, had gone 1 yr with positive OPKs with timed intercourse with no pregnancy,  subsequently diagnosed with low testosterone.  - Has seen dietitian at [facility] , doing diet and no weight loss yet.  - Intermittent LLQ pain few times per week, sometimes on right side too.    Past Medical History:  Past Medical History:   Diagnosis Date    ADHD (attention deficit hyperactivity disorder)     Anxiety     Hirschsprung's disease     Kidney stone     OCD (obsessive compulsive disorder)     PCOS (polycystic ovarian syndrome)      Past Surgical History:  Past Surgical History:   Procedure Laterality Date    COLON SURGERY      When born    COLONOSCOPY N/A 2023    Procedure: COLONOSCOPY;  Surgeon: Miguelito Bird MD;  Location: [facility] ENDOSCOPY;  Service: Endoscopy;  Laterality: N/A;    COLOSTOMY      2 months old    COLOSTOMY CLOSURE      10 months old    LAPAROSCOPIC ENDO-RECTAL PULL THROUGH FOR HIRSCHSPRUNG'S DISEASE      SMALL INTESTINE SURGERY      Imtestinal surgery when I was born.     Gynecologic History:  LMP: 2024  Hx of STDs: Hx of chlamydia remote.  Hx of abnormal pap: Denies.  Sexually active: Yes  Contraception: None.    Obstetric History:  OB History    Para Term  AB Living   0 0 0 0 0 0   SAB IAB Ectopic Multiple Live Births   0 0 0 0 0       Family History:   Family History   Problem Relation Name Age of Onset    Hypertension Mother      Cancer Father          "nose cancer"    Hirschsprung's disease Father      " Endometriosis Sister          1/2 sister    No Known Problems Sister          1/2 sister    No Known Problems Brother          1/2 brother    No Known Problems Brother          1/2 brother    No Known Problems Brother          1/2 brother    Heart disease Maternal Aunt      Ovarian cancer Maternal Aunt      Breast cancer Maternal Aunt  30    Heart disease Maternal Grandmother      Heart disease Paternal Grandmother      Heart disease Paternal Grandfather         Social History:  Denies tobacco, alcohol, and illicit drug use.  Social History     Socioeconomic History    Marital status:     Number of children: 0   Occupational History    Occupation: sales   Tobacco Use    Smoking status: Former     Current packs/day: 0.00     Types: Cigarettes, Vaping with nicotine     Quit date:      Years since quittin.8    Smokeless tobacco: Former    Tobacco comments:     Quit smoking 2009     Only vaped for a few months    Substance and Sexual Activity    Alcohol use: Yes     Comment: once a year    Drug use: Never    Sexual activity: Yes     Partners: Male     Birth control/protection: None   Social History Narrative    ** Merged History Encounter **          Social Drivers of Health     Financial Resource Strain: Patient Declined (2024)    Overall Financial Resource Strain (CARDIA)     Difficulty of Paying Living Expenses: Patient declined   Food Insecurity: Patient Declined (2024)    Hunger Vital Sign     Worried About Running Out of Food in the Last Year: Patient declined     Ran Out of Food in the Last Year: Patient declined   Transportation Needs: No Transportation Needs (2022)    PRAPARE - Transportation     Lack of Transportation (Medical): No     Lack of Transportation (Non-Medical): No   Physical Activity: Sufficiently Active (2024)    Exercise Vital Sign     Days of Exercise per Week: 5 days     Minutes of Exercise per Session: 150+ min   Stress: Stress Concern Present (2024)     [facility] [employer] - Occupational Stress Questionnaire     Feeling of Stress : To some extent   Housing Stability: Unknown (7/14/2024)    Housing Stability Vital Sign     Unable to Pay for Housing in the Last Year: Patient declined       Allergies:  Review of patient's allergies indicates:   Allergen Reactions    Codeine      Pt reports reaction is convulsions    Hydrocodone     Morphine     Pcn [penicillins]      Pt reports reaction is convulsions    Sulfa (sulfonamide antibiotics) Rash    Tramadol Other (See Comments)    Ketorolac Other (See Comments)    Levofloxacin Hives     Other reaction(s): Generalized body aches    Metformin Diarrhea    Metronidazole     Norethindrone ac-eth estradiol Other (See Comments)     Other reaction(s): Mood swings  Other reaction(s): Mood swings    Doxycycline Nausea And Vomiting    Nitrofurantoin monohyd/m-cryst Nausea And Vomiting       Medications:    Current Outpatient Medications:     diclofenac sodium (VOLTAREN ARTHRITIS PAIN) 1 % Gel, Apply 2 g topically 4 (four) times daily. Do not exceed 32 grams/day: do not to exceed 8 grams/day/single joint of upper extremities; do not to exceed 16 grams/day/single joint of lower extremities.  Please request refill of this medication from your PCP., Disp: 100 g, Rfl: 3    fluticasone propionate (FLONASE) 50 mcg/actuation nasal spray, 2 sprays (100 mcg total) by Each Nostril route once daily., Disp: 16 g, Rfl: 0    loratadine (CLARITIN) 10 mg tablet, Take 1 tablet (10 mg total) by mouth daily as needed for Allergies., Disp: 30 tablet, Rfl: 0    lubiprostone (AMITIZA) 8 MCG Cap, Take 1 capsule (8 mcg total) by mouth 2 (two) times daily., Disp: 60 capsule, Rfl: 5    medroxyPROGESTERone (PROVERA) 10 MG tablet, Take 1 tablet daily for 10 days every month to induce withdrawal bleeding., Disp: 90 tablet, Rfl: 3    oxybutynin (DITROPAN-XL) 10 MG 24 hr tablet, Take 10 mg by mouth., Disp: , Rfl:     phenazopyridine (PYRIDIUM)  "200 MG tablet, Take 200 mg by mouth 3 (three) times daily., Disp: , Rfl:     Review of Systems:  Negative unless noted in HPI.    Objective:     Vitals:    11/22/24 1145   BP: 103/61   BP Location: Right arm   Patient Position: Sitting   Pulse: 68   Resp: 20   Temp: 98.2 °F (36.8 °C)   SpO2: 100%   Weight: 101.2 kg (223 lb)   Height: 5' 4" (1.626 m)     Body mass index is 38.28 kg/m².    Physical Exam:  General: Alert and oriented, in no acute distress.  Lungs: No conversational dyspnea, no respiratory distress, no tachypnea.  Heart: Regular rate.  Abdomen: Soft, non-distended, non tender to palpation, no involuntary guarding, no rebound tenderness.  Extremities: No cords or calf tenderness.  External genitalia: Normal female genitalia without lesion, discharge or tenderness. Normal appearing urethral meatus. Normal appearing external anus.  Bimanual Exam: No inguinal lymphadenopathy noted bilaterally. Vagina with adequate capacity. Uterus normal size, adnexa difficult to assess 2/2 body habitus. Cervix significantly deviated anterior toward abdominal wall.  Pelvic Floor: Bilateral piriformis muscles tender to palpation.  Speculum Exam: Vaginal mucosa normal in appearance, no lesions or discharge. Pink with normal rugae. Unable to visualized cervix, significantly deviated to anterior abdominal wall.    Note: Female RN chaperone present for entirety of exam.     Imaging  No images are attached to the encounter.  Assessment/Plan:    Fanny Murguia is a 34 y.o. G0 with AUB-O and primary infertility in setting of PCOS.    AUB-O, PCOS:  Provera 10mg daily x 10 days each month, pt counseled on need for withdrawal bleed to prevent hyperplasia/malignancy.  Recommended weight loss and referral placed to weight loss program. Counseled on diet and exercise with goal of 5-10% weight loss to improve ovulatory fxn.  Primary infertility:  Recommended consultation with [facility] as nearing AMA, failed timed intercourse with OPKs, " and  with low testosterone.  Recommend  obtain semen analysis.  Pelvic pain:  Likely multifactorial in nature given hx of Hirschsprung disease and significant bowel surgery history.  Also with pelvic floor myalgia on exam, reports I was able to reproduce her pain on pelvic floor exam. Referral placed for pelvic floor PT.    RTC as scheduled for WWE with NP, follow up with GYN prn after [facility] consultation.    Future Appointments   Date Time Provider Department Center   11/26/2024  1:10 PM Jami Oneill FNP [facility] GYN [place]    5/19/2025  2:30 PM Miladis Holloway FNP [facility] GASTRO [place]    7/25/2025  8:00 AM Andry Bailey MD [facility]     Patient and plan were discussed with Dr. Zafar.    Norberto Ferrari MD  [facility], PGY-3

## 2024-12-06 ENCOUNTER — OFFICE VISIT (OUTPATIENT)
Dept: GYNECOLOGY | Facility: CLINIC | Age: 34
End: 2024-12-06
Payer: COMMERCIAL

## 2024-12-06 VITALS
TEMPERATURE: 98 F | HEIGHT: 64 IN | RESPIRATION RATE: 20 BRPM | WEIGHT: 229 LBS | HEART RATE: 77 BPM | OXYGEN SATURATION: 100 % | BODY MASS INDEX: 39.09 KG/M2 | DIASTOLIC BLOOD PRESSURE: 83 MMHG | SYSTOLIC BLOOD PRESSURE: 129 MMHG

## 2024-12-06 DIAGNOSIS — N64.4 BREAST PAIN: ICD-10-CM

## 2024-12-06 DIAGNOSIS — Z01.419 ENCOUNTER FOR ANNUAL ROUTINE GYNECOLOGICAL EXAMINATION: Primary | ICD-10-CM

## 2024-12-06 DIAGNOSIS — L30.4 INTERTRIGO: ICD-10-CM

## 2024-12-06 PROCEDURE — 99214 OFFICE O/P EST MOD 30 MIN: CPT | Mod: PBBFAC | Performed by: NURSE PRACTITIONER

## 2024-12-06 RX ORDER — FLUCONAZOLE 150 MG/1
150 TABLET ORAL DAILY
Qty: 2 TABLET | Refills: 0 | Status: SHIPPED | OUTPATIENT
Start: 2024-12-06 | End: 2024-12-08

## 2024-12-06 RX ORDER — NYSTATIN 100000 [USP'U]/G
POWDER TOPICAL 2 TIMES DAILY
Qty: 60 G | Refills: 3 | Status: SHIPPED | OUTPATIENT
Start: 2024-12-06 | End: 2024-12-20

## 2024-12-06 RX ORDER — CLOTRIMAZOLE 1 %
CREAM (GRAM) TOPICAL 2 TIMES DAILY
Qty: 60 G | Refills: 1 | Status: SHIPPED | OUTPATIENT
Start: 2024-12-06 | End: 2025-01-05

## 2024-12-06 NOTE — PROGRESS NOTES
[facility] -  Gynecology / Women's Health Clinic      Subjective:       Patient ID: Fanny Murguia is a 34 y.o. female.    Chief Complaint:  Gynecologic Exam    History of Present Illness  The patient is G0 here for annual exam. Pt has hx of PCOS and pelvic pain followed by GYN, last visit was 11/2024. Pt was placed on cyclical Provera. Pt is on day 5 of Provera. Denies history of abnormal paps. Last pap 2023-NIL and HPV neg.  Denies breast or urinary complaints. Denies pelvic pain, abnormal bleeding or discharge. Pt reports chlamydia and trichomonas in the past and desires testing. Pt is also followed by urology for IC, bladder spasms, last visit in 2023, pt is now followed by outside urology with recent cystoscopy. HPV vaccinated. Currently not on contraception and declines as she desires pregnancy. Pt was recommended to f/u with [facility]. Denies tobacco use. Dep. screening 0. Denies fly hx of uterine or colon cancer. Maternal aunt with breast and ovarian cancer. Cologuard in 12/2023.    GYN & OB History  Patient's last menstrual period was 02/06/2024 (approximate).   Date of Last Pap: 12/5/2023    Review of patient's allergies indicates:   Allergen Reactions    Codeine      Pt reports reaction is convulsions    Hydrocodone     Morphine     Pcn [penicillins]      Pt reports reaction is convulsions    Sulfa (sulfonamide antibiotics) Rash    Tramadol Other (See Comments)    Ketorolac Other (See Comments)    Levofloxacin Hives     Other reaction(s): Generalized body aches    Metformin Diarrhea    Metronidazole     Norethindrone ac-eth estradiol Other (See Comments)     Other reaction(s): Mood swings  Other reaction(s): Mood swings    Doxycycline Nausea And Vomiting    Nitrofurantoin monohyd/m-cryst Nausea And Vomiting     Past Medical History:   Diagnosis Date    ADHD (attention deficit hyperactivity disorder)     Anxiety     Hirschsprung's disease     Kidney stone     OCD (obsessive compulsive  "disorder)     PCOS (polycystic ovarian syndrome)      OB History    Para Term  AB Living   0 0 0 0 0 0   SAB IAB Ectopic Multiple Live Births   0 0 0 0 0        Review of Systems  Review of Systems    Negative except for pertinent findings for positives per HPI     Objective:    Physical Exam    /83 (BP Location: Left arm, Patient Position: Sitting)   Pulse 77   Temp 98.4 °F (36.9 °C) (Oral)   Resp 20   Ht 5' 4" (1.626 m)   Wt 103.9 kg (229 lb)   LMP 2024 (Approximate)   SpO2 100%   BMI 39.31 kg/m²   GENERAL: Well-developed female. No acute distress.    SKIN: Normal to inspection, warm and intact.  BREASTS: No rashes or erythema. No masses, lumps, discharge. LT breast tenderness @ 6 o'clock near breast fold.  VULVA: General appearance normal; external genitalia with no lesions or erythema.  VAGINA: Mucosa/vaginal vault pink, no abnormal discharge or lesions.  CERVIX: Pink, nulliparous appearing os anterior and high in vaginal vault, no erythema or abnormal discharge.  BIMANUAL EXAM: reveals a 10 week-sized uterus. The uterus is non tender. Cali adnexa reveal no tenderness.  PSYCHIATRIC: Patient is oriented to person, place, and time. Mood and affect are normal.    Assessment:         ICD-10-CM ICD-9-CM   1. Encounter for annual routine gynecological examination  Z01.419 V72.31   2. Breast pain  N64.4 611.71   3. Intertrigo  L30.4 695.89     Plan:   Fanny was seen today for gynecologic exam.    Diagnoses and all orders for this visit:    Encounter for annual routine gynecological examination    Breast pain  -     Mammo Digital Diagnostic Bilat with Christopher; Future  -     US Breast Left Limited; Future    Intertrigo  -     fluconazole (DIFLUCAN) 150 MG Tab; Take 1 tablet (150 mg total) by mouth once daily. May repeat in 3 days if still with symptoms. for 2 doses  -     nystatin (MYCOSTATIN) powder; Apply topically 2 (two) times daily. for 14 days  -     clotrimazole (LOTRIMIN) 1 % cream; " Apply topically 2 (two) times daily.    Pelvic today, pap utd per ACOG  MG and Lt breast uls for LT breast pain  Intertrigo  Follow up in about 1 year (around 12/6/2025) for annual exam.

## 2025-02-20 ENCOUNTER — OFFICE VISIT (OUTPATIENT)
Dept: URGENT CARE | Facility: CLINIC | Age: 35
End: 2025-02-20
Payer: COMMERCIAL

## 2025-02-20 VITALS
WEIGHT: 232 LBS | TEMPERATURE: 98 F | OXYGEN SATURATION: 98 % | HEART RATE: 79 BPM | SYSTOLIC BLOOD PRESSURE: 129 MMHG | DIASTOLIC BLOOD PRESSURE: 85 MMHG | RESPIRATION RATE: 18 BRPM | BODY MASS INDEX: 39.61 KG/M2 | HEIGHT: 64 IN

## 2025-02-20 DIAGNOSIS — J02.9 SORE THROAT: ICD-10-CM

## 2025-02-20 DIAGNOSIS — R09.82 PND (POST-NASAL DRIP): Primary | ICD-10-CM

## 2025-02-20 LAB
CTP QC/QA: YES
MOLECULAR STREP A: NEGATIVE

## 2025-02-20 PROCEDURE — 99215 OFFICE O/P EST HI 40 MIN: CPT | Mod: PBBFAC | Performed by: NURSE PRACTITIONER

## 2025-02-20 PROCEDURE — 87651 STREP A DNA AMP PROBE: CPT | Mod: PBBFAC | Performed by: NURSE PRACTITIONER

## 2025-02-20 RX ORDER — LORATADINE 10 MG/1
10 TABLET ORAL DAILY
Qty: 30 TABLET | Refills: 0 | Status: SHIPPED | OUTPATIENT
Start: 2025-02-20 | End: 2025-03-22

## 2025-02-20 RX ORDER — FLUTICASONE PROPIONATE 50 MCG
2 SPRAY, SUSPENSION (ML) NASAL DAILY
Qty: 18.2 ML | Refills: 0 | Status: SHIPPED | OUTPATIENT
Start: 2025-02-20

## 2025-02-20 NOTE — PROGRESS NOTES
"Subjective:      Patient ID: Fanny Murguia is a 34 y.o. female.    Vitals:  height is 5' 4" (1.626 m) and weight is 105.2 kg (232 lb). Her temperature is 98.1 °F (36.7 °C). Her blood pressure is 129/85 and her pulse is 79. Her respiration is 18 and oxygen saturation is 98%.     Chief Complaint: Sore Throat (Pt c/o sore throat , puss pockets , headache, PND , lt sided tooth extraction possible infection , nausea x last Saturday )    Sore Throat      as stated in chief complaint.  Patient reports intermittent nausea since resolved.  Patient suspected that she has some discomfort and mouth soreness and sore throat from on tooth extraction from 1 year ago and patient states that she can still feel where the tooth was extracted however patient states since when she spoke with her dentist he told her that he might need to have a oral surgeon dress her concerns.  Patient reports sore throat and pus being on the back of her tonsils intermittently none senior living today.  Patient denies taking any medication for treatment of symptoms.      HENT:  Positive for sore throat.       Objective:     Physical Exam   Constitutional: She appears well-developed. She is cooperative.  Non-toxic appearance. She does not appear ill. No distress. awake  HENT:   Head: Atraumatic.   Nose: No purulent discharge. Right sinus exhibits no maxillary sinus tenderness and no frontal sinus tenderness. Left sinus exhibits no maxillary sinus tenderness and no frontal sinus tenderness.   Mouth/Throat: Uvula is midline, oropharynx is clear and moist and mucous membranes are normal. Dental caries present. Cobblestoning present. No oropharyngeal exudate or posterior oropharyngeal edema. Tonsils are 0 on the right. Tonsils are 0 on the left. No tonsillar exudate.   Plaque buildup noted but no gingival hyperplasia there is an old extraction to left lower molar minimal to no but has mild tenderness to palpation no pus, redness or swelling noted      " Comments: Plaque buildup noted but no gingival hyperplasia there is an old extraction to left lower molar minimal to no but has mild tenderness to palpation no pus, redness or swelling noted  Eyes: Right eye exhibits no discharge. Left eye exhibits no discharge. Extraocular movement intact   Neck: Neck supple. No neck rigidity present.   Cardiovascular: Regular rhythm.   Pulmonary/Chest: Effort normal and breath sounds normal. No respiratory distress. She has no wheezes. She has no rhonchi. She has no rales.   Lymphadenopathy:     She has no cervical adenopathy.   Neurological: She is alert.   Skin: Skin is warm, dry and not diaphoretic.   Psychiatric: Her behavior is normal.   Nursing note and vitals reviewed.      Assessment:     1. PND (post-nasal drip)    2. Sore throat      Results for orders placed or performed in visit on 02/20/25   POCT Strep A, Molecular    Collection Time: 02/20/25 12:12 PM   Result Value Ref Range    Molecular Strep A, POC Negative Negative     Acceptable Yes          Plan:   ER precautions given and discussed  Upon examination there was no suspected infection from tooth extraction of 1 year ago.  Patient encouraged to seek dental evaluation as priority  - nasal saline lavage  -warm salt water gargles to your throat  - OTC cold/flu products as desired for symptoms  - Plenty of fluids  - Humidified air  - Tylenol or Motrin for pain/fever  PND (post-nasal drip)    Sore throat  -     POCT Strep A, Molecular    Other orders  -     fluticasone propionate (FLONASE) 50 mcg/actuation nasal spray; 2 sprays (100 mcg total) by Each Nostril route once daily.  Dispense: 18.2 mL; Refill: 0  -     loratadine (CLARITIN) 10 mg tablet; Take 1 tablet (10 mg total) by mouth once daily.  Dispense: 30 tablet; Refill: 0

## 2025-02-20 NOTE — PATIENT INSTRUCTIONS
Please follow instructions on patient education material.      Return to urgent care in 2 to 3 days if symptoms are not improving, immediately if you develop any new or worsening symptoms.   - nasal saline lavage  -warm salt water gargles to your throat  - OTC cold/flu products as desired for symptoms  - Plenty of fluids  - Humidified air  - Tylenol or Motrin for pain/fever  -encouraged to seek dental evaluation as priority    Go to the ER if you experience chest pain with shortness of breath, shortness of breath when moving around your house, high fevers 103.0+, excessive vomiting/diarrhea, or general distress.

## 2025-03-18 ENCOUNTER — HOSPITAL ENCOUNTER (OUTPATIENT)
Dept: RADIOLOGY | Facility: HOSPITAL | Age: 35
Discharge: HOME | End: 2025-03-18
Attending: NURSE PRACTITIONER
Payer: COMMERCIAL

## 2025-03-18 DIAGNOSIS — N64.4 BREAST PAIN: ICD-10-CM

## 2025-03-18 PROCEDURE — 76642 ULTRASOUND BREAST LIMITED: CPT | Mod: TC,50

## 2025-03-18 PROCEDURE — 76642 ULTRASOUND BREAST LIMITED: CPT | Mod: 26,50,, | Performed by: STUDENT IN AN ORGANIZED HEALTH CARE EDUCATION/TRAINING PROGRAM

## 2025-03-18 PROCEDURE — 77062 BREAST TOMOSYNTHESIS BI: CPT | Mod: 26,,, | Performed by: STUDENT IN AN ORGANIZED HEALTH CARE EDUCATION/TRAINING PROGRAM

## 2025-03-18 PROCEDURE — 77066 DX MAMMO INCL CAD BI: CPT | Mod: TC

## 2025-03-18 PROCEDURE — 77066 DX MAMMO INCL CAD BI: CPT | Mod: 26,,, | Performed by: STUDENT IN AN ORGANIZED HEALTH CARE EDUCATION/TRAINING PROGRAM

## 2025-05-19 ENCOUNTER — OFFICE VISIT (OUTPATIENT)
Dept: GASTROENTEROLOGY | Facility: CLINIC | Age: 35
End: 2025-05-19
Payer: COMMERCIAL

## 2025-05-19 VITALS
WEIGHT: 225.63 LBS | BODY MASS INDEX: 38.52 KG/M2 | SYSTOLIC BLOOD PRESSURE: 117 MMHG | TEMPERATURE: 98 F | DIASTOLIC BLOOD PRESSURE: 82 MMHG | OXYGEN SATURATION: 98 % | HEART RATE: 80 BPM | HEIGHT: 64 IN | RESPIRATION RATE: 16 BRPM

## 2025-05-19 DIAGNOSIS — Z87.738 HISTORY OF HIRSCHSPRUNG'S DISEASE: ICD-10-CM

## 2025-05-19 DIAGNOSIS — K59.04 CHRONIC IDIOPATHIC CONSTIPATION: Primary | ICD-10-CM

## 2025-05-19 PROCEDURE — 1160F RVW MEDS BY RX/DR IN RCRD: CPT | Mod: CPTII,,, | Performed by: NURSE PRACTITIONER

## 2025-05-19 PROCEDURE — 3074F SYST BP LT 130 MM HG: CPT | Mod: CPTII,,, | Performed by: NURSE PRACTITIONER

## 2025-05-19 PROCEDURE — 99214 OFFICE O/P EST MOD 30 MIN: CPT | Mod: PBBFAC | Performed by: NURSE PRACTITIONER

## 2025-05-19 PROCEDURE — 3008F BODY MASS INDEX DOCD: CPT | Mod: CPTII,,, | Performed by: NURSE PRACTITIONER

## 2025-05-19 PROCEDURE — 3079F DIAST BP 80-89 MM HG: CPT | Mod: CPTII,,, | Performed by: NURSE PRACTITIONER

## 2025-05-19 PROCEDURE — 1159F MED LIST DOCD IN RCRD: CPT | Mod: CPTII,,, | Performed by: NURSE PRACTITIONER

## 2025-05-19 PROCEDURE — 99214 OFFICE O/P EST MOD 30 MIN: CPT | Mod: S$PBB,,, | Performed by: NURSE PRACTITIONER

## 2025-05-19 RX ORDER — LUBIPROSTONE 8 UG/1
8 CAPSULE ORAL 2 TIMES DAILY
Qty: 60 CAPSULE | Refills: 5 | Status: SHIPPED | OUTPATIENT
Start: 2025-05-19

## 2025-05-19 NOTE — PROGRESS NOTES
Subjective:       Patient ID: Fanny Murguia is a 34 y.o. female.    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  She was referred for constipation and seen March 30, 2023.  She reported a longstanding history of chronic constipation and going up to 5-7 days without a bowel movement.  She had tried several OTC medications for treatment of constipation with minimal relief noted including stool softeners and magnesium citrate.  She had associated bloating and gas.  She had occasional straining and did not always feel completely evacuated when she did have a bowel movement.  She had occasional red blood with bowel movements noted on the tissue after wiping that was very infrequent.  She expressed concerns about hemorrhoids.  She had occasional nausea without vomiting.  She had tried increasing dietary fiber and was seeing a dietitian for nutritional counseling.  Her appetite was good and her weight was stable.  She denied fever, chills, hematemesis, odynophagia, dysphagia, acid reflux, pyrosis, or early satiety.  She denied melena, fecal urgency, fecal incontinence, or pain with defecation.     Pelvic ultrasound August 9, 2023 revealed increased ovarian volumes and otherwise unremarkable exam.  CT scan abdomen and pelvis without contrast August 26, 2023 revealed enlarged appearance of the left ovary, potentially by underlying large follicle, located high in the left hemipelvis and posterior to the descending colon.  In the setting of left lower quadrant pain, further ovary evaluation with ultrasound suggested.  Given the position of the ovary, transabdominal technique suggested.  Mild hepatic steatosis.  Small  nonobstructive right renal stones.  Pelvic ultrasound August 26, 2023 revealed left ovarian cyst.     She was seen for a follow-up visit November 9, 2023.  She reported taking Linzess 145 mcg as needed and then 72 mcg as needed but having to stop the medication and dosages secondary to feeling drained with taking the medication.  She reported minimal change in symptoms from previous office visit in regards to constipation, bloating, gas, and abdominal discomfort.  Her appetite was good and her weight was stable.  She denied fever, chills, hematemesis, odynophagia, dysphagia, acid reflux, pyrosis, or early satiety.  She denied melena, hematochezia, fecal urgency, fecal incontinence, or pain with defecation.  She was prescribed Amitiza 8 mcg twice daily.     She underwent colonoscopy December 7, 2023 which was normal and no specimens were collected with a recommended recall of 10 years.     She was seen for a follow-up visit May 9, 2024.  She reported taking Amitiza 8 mcg twice daily as needed, as well as increased water intake and MiraLax as needed.  She had made dietary modifications.  This had alleviated symptoms of constipation, bloating, gas, and abdominal discomfort.  She reported feeling well at that time without any problems noted.     CT scan abdomen and pelvis with and without contrast October 23, 2024 revealed couple of punctate nonobstructing medullary calculi in the right kidney, otherwise unremarkable.     She was seen for a follow-up visit November 11, 2024.  She reported that she had been out of Amitiza 8 mcg for the past several months as she was unsure if the medication was sent to [facility] pharmacy.  I did inform the patient at time of last office visit May 9, 2024 that the medication was being sent to the pharmacy and this was verified.  She reported less frequent bowel movements since being out of Amitiza and requested a refill of the medication.  She typically had 1 formed stool every 1-3 days but  did not always feel completely evacuated.  She denied melena, hematochezia, fecal urgency, fecal incontinence, or pain with defecation.  She denied abdominal pain.  Her appetite was good and her weight was stable.  She denied fever, chills, nausea, vomiting, hematemesis, odynophagia, dysphagia, acid reflux, pyrosis, early satiety, or abdominal pain.    Today, she presents for a follow-up visit.  She reports not taking Amitiza 8 mcg often and taking soluble fiber supplementation.  She typically has 1 formed stool every 1-2 days and feels that bowel habits have improved with dietary modifications and more intake of fiber.  She does take Amitiza when she has not had a bowel movement in approximately 3 days.  This does help her when she takes the medication.  She otherwise feels well and denies any other problems at this time.     She denies ever having an EGD done. She denies regular NSAID use or use of blood thinners. She denies tobacco or alcohol use. She denies a family history of IBD, colon polyps, or colon cancer.      Review of patient's allergies indicates:   Allergen Reactions    Codeine      Pt reports reaction is convulsions    Hydrocodone     Morphine     Pcn [penicillins]      Pt reports reaction is convulsions    Sulfa (sulfonamide antibiotics) Rash    Tramadol Other (See Comments)    Ketorolac Other (See Comments)    Levofloxacin Hives     Other reaction(s): Generalized body aches    Metformin Diarrhea    Metronidazole     Norethindrone ac-eth estradiol Other (See Comments)     Other reaction(s): Mood swings  Other reaction(s): Mood swings    Doxycycline Nausea And Vomiting    Nitrofurantoin monohyd/m-cryst Nausea And Vomiting     Past Medical History:   Diagnosis Date    ADHD (attention deficit hyperactivity disorder)     Anxiety     Hirschsprung's disease     Kidney stone     OCD (obsessive compulsive disorder)     PCOS (polycystic ovarian syndrome)      Past Surgical History:   Procedure Laterality  Date    COLON SURGERY  1990    When born    COLONOSCOPY N/A 12/07/2023    Procedure: COLONOSCOPY;  Surgeon: Person, Miguelito APPLE MD;  Location: [facility] ENDOSCOPY;  Service: Endoscopy;  Laterality: N/A;    COLOSTOMY  1990    2 months old    COLOSTOMY CLOSURE  1991    10 months old    LAPAROSCOPIC ENDO-RECTAL PULL THROUGH FOR HIRSCHSPRUNG'S DISEASE      SMALL INTESTINE SURGERY  1990    Imtestinal surgery when I was born.     Family History:   family history includes Breast cancer (age of onset: 30) in her maternal aunt; Cancer in her father; Endometriosis in her sister; Heart disease in her maternal aunt, maternal grandmother, paternal grandfather, and paternal grandmother; Hirschsprung's disease in her father; Hypertension in her mother; No Known Problems in her brother, brother, brother, and sister; Ovarian cancer in her maternal aunt.    Social History:    reports that she quit smoking about 6 years ago. Her smoking use included cigarettes. She has quit using smokeless tobacco. She reports current alcohol use. She reports that she does not use drugs.    Review of Systems  Negative except as noted in the HPI.      Objective:      Physical Exam  Constitutional:       Appearance: Normal appearance.   HENT:      Head: Normocephalic.      Mouth/Throat:      Mouth: Mucous membranes are moist.   Eyes:      Extraocular Movements: Extraocular movements intact.      Conjunctiva/sclera: Conjunctivae normal.      Pupils: Pupils are equal, round, and reactive to light.   Cardiovascular:      Rate and Rhythm: Normal rate and regular rhythm.      Pulses: Normal pulses.      Heart sounds: Normal heart sounds.   Pulmonary:      Effort: Pulmonary effort is normal.      Breath sounds: Normal breath sounds.   Abdominal:      General: Bowel sounds are normal.      Palpations: Abdomen is soft.   Musculoskeletal:         General: Normal range of motion.      Cervical back: Normal range of motion and neck supple.   Skin:     General: Skin is  warm and dry.   Neurological:      General: No focal deficit present.      Mental Status: She is alert and oriented to person, place, and time.   Psychiatric:         Mood and Affect: Mood normal.         Behavior: Behavior normal.         Thought Content: Thought content normal.         Judgment: Judgment normal.         Home Medications:     Current Outpatient Medications   Medication Sig    medroxyPROGESTERone (PROVERA) 10 MG tablet Take 1 tablet daily for 10 days every month to induce withdrawal bleeding.    oxybutynin (DITROPAN-XL) 10 MG 24 hr tablet Take 10 mg by mouth.    clotrimazole (LOTRIMIN) 1 % cream Apply topically 2 (two) times daily.    diclofenac sodium (VOLTAREN ARTHRITIS PAIN) 1 % Gel Apply 2 g topically 4 (four) times daily. Do not exceed 32 grams/day: do not to exceed 8 grams/day/single joint of upper extremities; do not to exceed 16 grams/day/single joint of lower extremities.  Please request refill of this medication from your PCP. (Patient not taking: Reported on 5/19/2025)    fluticasone propionate (FLONASE) 50 mcg/actuation nasal spray 2 sprays (100 mcg total) by Each Nostril route once daily. (Patient not taking: Reported on 5/19/2025)    fluticasone propionate (FLONASE) 50 mcg/actuation nasal spray 2 sprays (100 mcg total) by Each Nostril route once daily. (Patient not taking: Reported on 5/19/2025)    loratadine (CLARITIN) 10 mg tablet Take 1 tablet (10 mg total) by mouth once daily.    lubiprostone (AMITIZA) 8 MCG Cap Take 1 capsule (8 mcg total) by mouth 2 (two) times daily.    nystatin (MYCOSTATIN) powder Apply topically 2 (two) times daily. for 14 days    phenazopyridine (PYRIDIUM) 200 MG tablet Take 200 mg by mouth 3 (three) times daily. (Patient not taking: Reported on 12/6/2024)     No current facility-administered medications for this visit.     Laboratory Results:     Recent Results (from the past 50 weeks)   POCT urine pregnancy    Collection Time: 07/08/24  3:13 PM   Result  Value Ref Range    POC Preg Test, Ur Negative Negative     Acceptable Yes    POCT Urinalysis, Dipstick, Automated, W/O Scope    Collection Time: 07/08/24  3:13 PM   Result Value Ref Range    POC Blood, Urine Negative Negative    POC Bilirubin, Urine Negative Negative    POC Urobilinogen, Urine 1.0 0.1 - 1.1    POC Ketones, Urine Negative Negative    POC Protein, Urine Negative Negative    POC Nitrates, Urine Negative Negative    POC Glucose, Urine Negative Negative    pH, UA 7.0     POC Specific Gravity, Urine 1.025 1.003 - 1.029    POC Leukocytes, Urine Negative Negative   Urinalysis, Reflex to Urine Culture    Collection Time: 07/08/24  3:30 PM    Specimen: Urine   Result Value Ref Range    Color, UA Light-Yellow Yellow, Light-Yellow, Dark Yellow, Tara, Straw    Appearance, UA Clear Clear    Specific Gravity, UA 1.035 (H) 1.005 - 1.030    pH, UA 6.5 5.0 - 8.5    Protein, UA Trace (A) Negative    Glucose, UA Normal Negative, Normal    Ketones, UA Negative Negative    Blood, UA Negative Negative    Bilirubin, UA Negative Negative    Urobilinogen, UA Normal 0.2, 1.0, Normal    Nitrites, UA Negative Negative    Leukocyte Esterase, UA Negative Negative    RBC, UA 0-5 None Seen, 0-2, 3-5, 0-5 /HPF    WBC, UA 0-5 None Seen, 0-2, 3-5, 0-5 /HPF    Bacteria, UA Trace (A) None Seen /HPF    Squamous Epithelial Cells, UA Few (A) None Seen /HPF    Mucous, UA Few (A) None Seen /LPF    Hyaline Casts, UA None Seen None Seen /lpf   Comprehensive Metabolic Panel    Collection Time: 07/17/24  9:57 AM   Result Value Ref Range    Sodium 141 136 - 145 mmol/L    Potassium 4.6 3.5 - 5.1 mmol/L    Chloride 102 98 - 107 mmol/L    CO2 26 21 - 32 mmol/L    Glucose 100 74 - 106 mg/dL    Blood Urea Nitrogen 12 7.0 - 18.0 mg/dL    Creatinine 0.68 0.60 - 1.30 mg/dL    Calcium 10.0 8.5 - 10.1 mg/dL    Protein Total 7.9 6.4 - 8.2 gm/dL    Albumin 5.1 (H) 3.4 - 5.0 g/dL    Globulin 2.8 1.2 - 3.0 gm/dL    Albumin/Globulin Ratio 1.8  1.5 - 2.0 ratio    Bilirubin Total 0.5 0.2 - 1.0 mg/dL    ALP 83 38 - 126 unit/L    ALT 28 7 - 52 unit/L    AST 22 15 - 37 unit/L    eGFR >60 mL/min/1.73/m2    BUN/Creatinine Ratio 18    Bilirubin, Direct    Collection Time: 07/17/24  9:57 AM   Result Value Ref Range    Bilirubin Direct 0.1 0.0 - 0.5 mg/dL   TSH    Collection Time: 07/17/24  9:57 AM   Result Value Ref Range    TSH 1.174 0.350 - 4.940 uIU/mL   Urinalysis    Collection Time: 07/17/24  9:57 AM   Result Value Ref Range    Color, UA Yellow Yellow, Light-Yellow, Dark Yellow, Tara, Straw    Appearance, UA Clear Clear    Specific Gravity, UA >=1.030 1.005 - 1.030    pH, UA 6.0 5.0 - 8.5    Protein, UA Negative Negative    Glucose, UA Negative Negative, Normal    Ketones, UA Negative Negative    Blood, UA Negative Negative    Bilirubin, UA Negative Negative    Urobilinogen, UA 0.2 0.2, 1.0, Normal    Nitrites, UA Negative Negative    Leukocyte Esterase, UA Negative Negative   Lipid Panel    Collection Time: 07/17/24  9:57 AM   Result Value Ref Range    Cholesterol Total 188 0 - 200 mg/dL    HDL Cholesterol 51 35 - 60 mg/dL    Triglyceride 73 30 - 150 mg/dL    Cholesterol/HDL Ratio 4     Very Low Density Lipoprotein 15     LDL Cholesterol 122.00 0.00 - 129.00 mg/dL   CBC with Differential    Collection Time: 07/17/24  9:57 AM   Result Value Ref Range    WBC 7.00 4.50 - 11.50 x10(3)/mcL    RBC 5.24 4.20 - 5.40 x10(6)/mcL    Hgb 15.2 12.0 - 16.0 g/dL    Hct 44.8 37.0 - 47.0 %    MCV 85.5 80.0 - 94.0 fL    MCH 29.0 27.0 - 31.0 pg    MCHC 33.9 33.0 - 36.0 g/dL    RDW 12.5 11.5 - 17.0 %    Platelet 314 130 - 400 x10(3)/mcL    MPV 9.7 7.4 - 10.4 fL    Neut % 64.0 %    Lymph % 26.5 %    Mono % 9.5 %    Lymph # 1.9 0.6 - 4.6 x10(3)/mcL    Neut # 4.4 2.1 - 9.2 x10(3)/mcL    Mono # 0.7 0.1 - 1.3 x10(3)/mcL   Urinalysis, Microscopic    Collection Time: 07/17/24  9:57 AM   Result Value Ref Range    Bacteria, UA None Seen None Seen, Rare, Occasional /HPF    RBC, UA None  Seen None Seen, 0-2, 3-5, 0-5 /HPF    WBC, UA 0-2 None Seen, 0-2, 3-5, 0-5 /HPF    Squamous Epithelial Cells, UA Rare None Seen, Rare, Occasional, Occ /HPF   POCT Urinalysis, Dipstick, Automated, W/O Scope    Collection Time: 07/30/24  6:12 PM   Result Value Ref Range    POC Blood, Urine Negative Negative    POC Bilirubin, Urine Negative Negative    POC Urobilinogen, Urine 0.2e.u./dl 0.1 - 1.1    POC Ketones, Urine Negative Negative    POC Protein, Urine Negative Negative    POC Nitrates, Urine Negative Negative    POC Glucose, Urine Negative Negative    pH, UA 7.0     POC Specific Gravity, Urine 1.025 1.003 - 1.029    POC Leukocytes, Urine Negative Negative   POCT urine pregnancy    Collection Time: 07/30/24  6:13 PM   Result Value Ref Range    POC Preg Test, Ur Negative Negative     Acceptable Yes    Trichomonas vaginalis Amplified RNA    Collection Time: 07/30/24  6:19 PM    Specimen: Urine   Result Value Ref Range    SOURCE: URINE     Trichomonas vaginalis amplified RNA Negative Negative   Chlamydia/GC, PCR    Collection Time: 07/30/24  6:19 PM    Specimen: Urine   Result Value Ref Range    Chlamydia trachomatis PCR Not Detected Not Detected    N. gonorrhea PCR Not Detected Not Detected    Source Urine    Wet Prep, Genital    Collection Time: 07/30/24  6:44 PM    Specimen: Cervicovaginal; Genital   Result Value Ref Range    WBC, Wet Prep Few (A) None Seen    Clue Cells, Wet Prep None Seen None Seen    Trichomonas, Wet Prep None Seen None Seen    Yeast, Wet Prep None Seen None Seen   Urinalysis, Reflex to Urine Culture    Collection Time: 08/03/24 12:05 AM    Specimen: Urine   Result Value Ref Range    Color, UA Light-Yellow Yellow, Light-Yellow, Dark Yellow, Tara, Straw    Appearance, UA Clear Clear    Specific Gravity, UA >=1.030 1.005 - 1.030    pH, UA 6.0 5.0 - 8.5    Protein, UA Negative Negative    Glucose, UA Negative Negative, Normal    Ketones, UA Negative Negative    Blood, UA Negative  Negative    Bilirubin, UA Negative Negative    Urobilinogen, UA 0.2 0.2, 1.0, Normal    Nitrites, UA Negative Negative    Leukocyte Esterase, UA Negative Negative   Pregnancy, urine rapid    Collection Time: 08/03/24 12:05 AM   Result Value Ref Range    hCG Qualitative, Urine Negative Negative   Comp. Metabolic Panel    Collection Time: 08/03/24  2:00 AM   Result Value Ref Range    Sodium 141 136 - 145 mmol/L    Potassium 3.9 3.5 - 5.1 mmol/L    Chloride 105 98 - 107 mmol/L    CO2 26 22 - 29 mmol/L    Glucose 102 (H) 74 - 100 mg/dL    Blood Urea Nitrogen 12.7 7.0 - 18.7 mg/dL    Creatinine 0.81 0.55 - 1.02 mg/dL    Calcium 10.1 8.4 - 10.2 mg/dL    Protein Total 8.0 6.4 - 8.3 gm/dL    Albumin 4.4 3.5 - 5.0 g/dL    Globulin 3.6 (H) 2.4 - 3.5 gm/dL    Albumin/Globulin Ratio 1.2 1.1 - 2.0 ratio    Bilirubin Total 0.3 <=1.5 mg/dL    ALP 95 40 - 150 unit/L    ALT 30 0 - 55 unit/L    AST 17 5 - 34 unit/L    eGFR >60 mL/min/1.73/m2    Anion Gap 10.0 mEq/L    BUN/Creatinine Ratio 16    Lipase    Collection Time: 08/03/24  2:00 AM   Result Value Ref Range    Lipase Level 21 <=60 U/L   CBC with Differential    Collection Time: 08/03/24  2:00 AM   Result Value Ref Range    WBC 9.27 4.50 - 11.50 x10(3)/mcL    RBC 5.06 4.20 - 5.40 x10(6)/mcL    Hgb 14.8 12.0 - 16.0 g/dL    Hct 43.3 37.0 - 47.0 %    MCV 85.6 80.0 - 94.0 fL    MCH 29.2 27.0 - 31.0 pg    MCHC 34.2 33.0 - 36.0 g/dL    RDW 12.6 11.5 - 17.0 %    Platelet 353 130 - 400 x10(3)/mcL    MPV 10.7 (H) 7.4 - 10.4 fL    Neut % 57.9 %    Lymph % 29.2 %    Mono % 8.7 %    Eos % 3.0 %    Basophil % 1.0 %    Lymph # 2.71 0.6 - 4.6 x10(3)/mcL    Neut # 5.36 2.1 - 9.2 x10(3)/mcL    Mono # 0.81 0.1 - 1.3 x10(3)/mcL    Eos # 0.28 0 - 0.9 x10(3)/mcL    Baso # 0.09 <=0.2 x10(3)/mcL    Imm Gran # 0.02 0 - 0.04 x10(3)/mcL    Imm Grans % 0.2 %    NRBC% 0.0 %   PREGNANCY TEST, URINE RAPID    Collection Time: 08/15/24  2:07 PM   Result Value Ref Range    Preg Test, Ur Negative Negative   SARS  Coronavirus 2 Antigen, POCT Manual Read    Collection Time: 09/04/24 10:03 AM   Result Value Ref Range    SARS Coronavirus 2 Antigen Positive (A) Negative     Acceptable Yes    POCT Strep A, Molecular    Collection Time: 09/06/24 10:27 AM   Result Value Ref Range    Molecular Strep A, POC Negative Negative     Acceptable Yes    POCT Urinalysis, Dipstick, Automated, W/O Scope    Collection Time: 11/20/24 12:22 PM   Result Value Ref Range    POC Blood, Urine Negative Negative    POC Bilirubin, Urine Negative Negative    POC Urobilinogen, Urine 0.2e.u./dl 0.1 - 1.1    POC Ketones, Urine Negative Negative    POC Protein, Urine Negative Negative    POC Nitrates, Urine Negative Negative    POC Glucose, Urine Negative Negative    pH, UA 7.0     POC Specific Gravity, Urine 1.020 1.003 - 1.029    POC Leukocytes, Urine Negative Negative   POCT urine pregnancy    Collection Time: 11/20/24 12:23 PM   Result Value Ref Range    POC Preg Test, Ur Negative Negative     Acceptable Yes    Wet Prep, Genital    Collection Time: 11/20/24 12:39 PM    Specimen: Cervicovaginal; Genital   Result Value Ref Range    WBC, Wet Prep Rare (A) None Seen    Clue Cells, Wet Prep None Seen None Seen    Trichomonas, Wet Prep None Seen None Seen    Yeast, Wet Prep None Seen None Seen   POCT Strep A, Molecular    Collection Time: 02/20/25 12:12 PM   Result Value Ref Range    Molecular Strep A, POC Negative Negative     Acceptable Yes      Assessment/Plan:     Problem List Items Addressed This Visit          GI    Chronic idiopathic constipation - Primary    Recommend soluble fiber supplementation  Avoid straining or sitting on the toilet for long periods of time  Pelvic ultrasound August 9, 2023 revealed increased ovarian volumes and otherwise unremarkable exam.    CT scan abdomen and pelvis without contrast August 26, 2023 revealed enlarged appearance of the left ovary, potentially by  underlying large follicle, located high in the left hemipelvis and posterior to the descending colon.  In the setting of left lower quadrant pain, further ovary evaluation with ultrasound suggested.  Given the position of the ovary, transabdominal technique suggested.  Mild hepatic steatosis.  Small nonobstructive right renal stones.    Pelvic ultrasound August 26, 2023 revealed left ovarian cyst.  She underwent colonoscopy December 7, 2023 which was normal and no specimens were collected with a recommended recall of 10 years.  Continue Amitiza 8 mcg twice daily as directed (refill prescription sent to [facility] pharmacy)  Call with updates  Follow-up clinic visit with NP in 6 months         Relevant Medications    lubiprostone (AMITIZA) 8 MCG Cap    History of Hirschsprung's disease    See above

## 2025-05-19 NOTE — ASSESSMENT & PLAN NOTE
Recommend soluble fiber supplementation  Avoid straining or sitting on the toilet for long periods of time  Pelvic ultrasound August 9, 2023 revealed increased ovarian volumes and otherwise unremarkable exam.    CT scan abdomen and pelvis without contrast August 26, 2023 revealed enlarged appearance of the left ovary, potentially by underlying large follicle, located high in the left hemipelvis and posterior to the descending colon.  In the setting of left lower quadrant pain, further ovary evaluation with ultrasound suggested.  Given the position of the ovary, transabdominal technique suggested.  Mild hepatic steatosis.  Small nonobstructive right renal stones.    Pelvic ultrasound August 26, 2023 revealed left ovarian cyst.  She underwent colonoscopy December 7, 2023 which was normal and no specimens were collected with a recommended recall of 10 years.  Continue Amitiza 8 mcg twice daily as directed (refill prescription sent to [facility] pharmacy)  Call with updates  Follow-up clinic visit with NP in 6 months

## 2025-07-25 PROBLEM — Q43.1: Status: ACTIVE | Noted: 2025-07-25

## 2025-07-25 PROBLEM — Z00.00 WELLNESS EXAMINATION: Status: ACTIVE | Noted: 2025-07-25

## 2025-07-25 PROBLEM — E78.49 OTHER HYPERLIPIDEMIA: Status: ACTIVE | Noted: 2025-07-25

## 2025-07-25 PROBLEM — E66.812 CLASS 2 OBESITY WITH BODY MASS INDEX (BMI) OF 39.0 TO 39.9 IN ADULT: Status: ACTIVE | Noted: 2025-07-25

## 2025-08-15 ENCOUNTER — OFFICE VISIT (OUTPATIENT)
Dept: URGENT CARE | Facility: CLINIC | Age: 35
End: 2025-08-15
Payer: COMMERCIAL

## 2025-08-15 VITALS
RESPIRATION RATE: 18 BRPM | WEIGHT: 225.5 LBS | OXYGEN SATURATION: 98 % | HEIGHT: 64 IN | HEART RATE: 78 BPM | DIASTOLIC BLOOD PRESSURE: 80 MMHG | SYSTOLIC BLOOD PRESSURE: 118 MMHG | TEMPERATURE: 98 F | BODY MASS INDEX: 38.5 KG/M2

## 2025-08-15 DIAGNOSIS — R07.0 THROAT PAIN IN ADULT: Primary | ICD-10-CM

## 2025-08-15 LAB
CTP QC/QA: YES
MOLECULAR STREP A: NEGATIVE

## 2025-08-15 PROCEDURE — 99214 OFFICE O/P EST MOD 30 MIN: CPT | Mod: PBBFAC
